# Patient Record
Sex: FEMALE | Race: WHITE | NOT HISPANIC OR LATINO | Employment: OTHER | URBAN - METROPOLITAN AREA
[De-identification: names, ages, dates, MRNs, and addresses within clinical notes are randomized per-mention and may not be internally consistent; named-entity substitution may affect disease eponyms.]

---

## 2021-05-24 ENCOUNTER — TRANSFERRED RECORDS (OUTPATIENT)
Dept: HEALTH INFORMATION MANAGEMENT | Facility: CLINIC | Age: 76
End: 2021-05-24

## 2021-05-24 ENCOUNTER — MEDICAL CORRESPONDENCE (OUTPATIENT)
Dept: HEALTH INFORMATION MANAGEMENT | Facility: CLINIC | Age: 76
End: 2021-05-24

## 2021-05-28 ENCOUNTER — TRANSCRIBE ORDERS (OUTPATIENT)
Dept: OTHER | Age: 76
End: 2021-05-28

## 2021-05-28 DIAGNOSIS — I71.40 ABDOMINAL ANEURYSM (H): Primary | ICD-10-CM

## 2021-06-02 NOTE — TELEPHONE ENCOUNTER
Action    Action Taken 6-2: Requested CT Chest 6-12-20 from [facility]  6-4: resolved CT chest in PACs

## 2021-07-08 ENCOUNTER — PRE VISIT (OUTPATIENT)
Dept: CARDIOLOGY | Facility: CLINIC | Age: 76
End: 2021-07-08

## 2021-07-08 ENCOUNTER — OFFICE VISIT (OUTPATIENT)
Dept: CARDIOLOGY | Facility: CLINIC | Age: 76
End: 2021-07-08
Attending: NURSE PRACTITIONER
Payer: COMMERCIAL

## 2021-07-08 VITALS
WEIGHT: 195 LBS | SYSTOLIC BLOOD PRESSURE: 124 MMHG | BODY MASS INDEX: 33.29 KG/M2 | OXYGEN SATURATION: 97 % | DIASTOLIC BLOOD PRESSURE: 76 MMHG | HEART RATE: 70 BPM | HEIGHT: 64 IN

## 2021-07-08 DIAGNOSIS — I71.21 THORACIC ASCENDING AORTIC ANEURYSM (H): Primary | ICD-10-CM

## 2021-07-08 DIAGNOSIS — I10 BENIGN ESSENTIAL HYPERTENSION: ICD-10-CM

## 2021-07-08 DIAGNOSIS — I71.40 ABDOMINAL ANEURYSM (H): ICD-10-CM

## 2021-07-08 PROCEDURE — 93005 ELECTROCARDIOGRAM TRACING: CPT

## 2021-07-08 PROCEDURE — 99204 OFFICE O/P NEW MOD 45 MIN: CPT | Mod: GC | Performed by: INTERNAL MEDICINE

## 2021-07-08 PROCEDURE — G0463 HOSPITAL OUTPT CLINIC VISIT: HCPCS | Mod: 25

## 2021-07-08 RX ORDER — LATANOPROST 50 UG/ML
1 SOLUTION/ DROPS OPHTHALMIC
COMMUNITY
Start: 2021-05-04 | End: 2024-08-15

## 2021-07-08 RX ORDER — SPIRONOLACTONE 25 MG/1
12.5 TABLET ORAL DAILY
Qty: 45 TABLET | Refills: 3 | Status: SHIPPED | OUTPATIENT
Start: 2021-07-08 | End: 2022-08-17

## 2021-07-08 RX ORDER — ALBUTEROL SULFATE 90 UG/1
1-2 AEROSOL, METERED RESPIRATORY (INHALATION)
COMMUNITY
Start: 2020-01-26

## 2021-07-08 RX ORDER — FLUTICASONE PROPIONATE 110 UG/1
1 AEROSOL, METERED RESPIRATORY (INHALATION)
COMMUNITY
Start: 2020-02-17

## 2021-07-08 RX ORDER — FOLIC ACID 1 MG/1
1 TABLET ORAL
COMMUNITY
Start: 2021-06-08

## 2021-07-08 RX ORDER — ALENDRONATE SODIUM 70 MG/1
70 TABLET ORAL
COMMUNITY
Start: 2021-06-08 | End: 2024-08-15

## 2021-07-08 RX ORDER — KETOCONAZOLE 20 MG/G
CREAM TOPICAL
COMMUNITY
Start: 2020-10-14

## 2021-07-08 RX ORDER — PREDNISONE 1 MG/1
3 TABLET ORAL
COMMUNITY
Start: 2021-01-22

## 2021-07-08 RX ORDER — OMEPRAZOLE 20 MG/1
40 TABLET, DELAYED RELEASE ORAL
COMMUNITY
Start: 2020-07-06 | End: 2024-08-15

## 2021-07-08 RX ORDER — AMLODIPINE BESYLATE 5 MG/1
5 TABLET ORAL
COMMUNITY
Start: 2020-07-16 | End: 2022-09-19

## 2021-07-08 RX ORDER — FLUTICASONE PROPIONATE 50 MCG
2 SPRAY, SUSPENSION (ML) NASAL
COMMUNITY
Start: 2020-01-23 | End: 2024-08-15

## 2021-07-08 RX ORDER — CYANOCOBALAMIN 1000 UG/ML
1000 INJECTION, SOLUTION INTRAMUSCULAR; SUBCUTANEOUS
COMMUNITY
Start: 2021-07-17 | End: 2022-07-11

## 2021-07-08 RX ORDER — FAMOTIDINE 40 MG/1
40 TABLET, FILM COATED ORAL
COMMUNITY
Start: 2020-08-20 | End: 2024-08-15

## 2021-07-08 RX ORDER — FUROSEMIDE 20 MG
TABLET ORAL
COMMUNITY
Start: 2021-01-11 | End: 2021-07-08 | Stop reason: ALTCHOICE

## 2021-07-08 RX ORDER — LEVOTHYROXINE SODIUM 175 UG/1
175 TABLET ORAL
COMMUNITY
Start: 2021-05-13 | End: 2023-10-26

## 2021-07-08 ASSESSMENT — MIFFLIN-ST. JEOR: SCORE: 1351.02

## 2021-07-08 ASSESSMENT — PAIN SCALES - GENERAL: PAINLEVEL: NO PAIN (0)

## 2021-07-08 NOTE — NURSING NOTE
Cardiac Testing: Patient given instructions regarding  echocardiogram . Discussed purpose, preparation, procedure and when to expect results reported back to the patient. Patient demonstrated understanding of this information and agreed to call with further questions or concerns.  Med Reconcile: Reviewed and verified all current medications with the patient. The updated medication list was printed and given to the patient.  New Medication: Patient was educated regarding newly prescribed medication, including discussion of  the indication, administration, side effects, and when to report to MD or RN. Patient demonstrated understanding of this information and agreed to call with further questions or concerns.  Return Appointment: Patient given instructions regarding scheduling next clinic visit. Patient demonstrated understanding of this information and agreed to call with further questions or concerns.  Medication Change: Patient was educated regarding prescribed medication change, including discussion of the indication, administration, side effects, and when to report to MD or RN. Patient demonstrated understanding of this information and agreed to call with further questions or concerns.  Patient stated she understood all health information given and agreed to call with further questions or concerns.    Ashley Farmer LPN

## 2021-07-08 NOTE — PATIENT INSTRUCTIONS
Patient Instructions:  It was a pleasure to see you in the cardiology clinic today.      If you have any questions, you can reach my nurse, Ashley KAUR LPN, at [phone].  Press Option #1 for the [facility], and then press Option #4 for nursing.    We are encouraging the use of [employer] to communicate with your HealthCare Provider    Medication Changes:   - Start Spironolactone 12.5 mg every day.  - Stop Furosamide (Lasix).     Recommendations: None.    Studies Ordered: Echocardiogram.  Please call [phone] to schedule.    The results from today include: None.    Please follow up: With Dr. Warren based on results of testing.    Sincerely,    Cy Warren MD     If you have an urgent need after hours (8:00 am to 4:30 pm) please call [phone] and ask for the cardiology fellow on call.      Patient Education     Spironolactone Oral Tablet 25 mg  Uses  This medicine is used for the following purposes:    heart failure    high blood pressure    hormonal disorder    swelling  Instructions  This medicine may be taken with or without food.  Do not take your last dose of the day within 4-6 hours of bedtime.  Keep the medicine at room temperature. Avoid heat and direct light.  This medicine will make you urinate more. If you have difficulty passing urine, please tell your doctor.  Talk to your doctor before eating foods with large amounts of potassium. Potassium is often found in salt substitutes. Your doctor may want you to reduce the amount of these foods.  It is important that you keep taking each dose of this medicine on time even if you are feeling well.  If you forget to take a dose on time, take it as soon as you remember. If it is almost time for the next dose, do not take the missed dose. Return to your normal dosing schedule. Do not take 2 doses of this medicine at one time.  Please tell your doctor and pharmacist about all the medicines you take. Include both prescription  and over-the-counter medicines. Also tell them about any vitamins, herbal medicines, or anything else you take for your health.  Do not suddenly stop taking this medicine. Check with your doctor before stopping.  It is very important that you follow your doctor's instructions for all blood tests.  Cautions  Tell your doctor and pharmacist if you ever had an allergic reaction to a medicine. Symptoms of an allergic reaction can include trouble breathing, skin rash, itching, swelling, or severe dizziness.  Do not use the medication any more than instructed.  Your ability to stay alert or to react quickly may be impaired by this medicine. Do not drive or operate machinery until you know how this medicine will affect you.  Tell the doctor or pharmacist if you are pregnant, planning to be pregnant, or breastfeeding.  Ask your pharmacist if this medicine can interact with any of your other medicines. Be sure to tell them about all the medicines you take.  Do not start or stop any other medicines without first speaking to your doctor or pharmacist.  Do not share this medicine with anyone who has not been prescribed this medicine.  Side Effects  The following is a list of some common side effects from this medicine. Please speak with your doctor about what you should do if you experience these or other side effects.    diarrhea    drowsiness or sedation    headaches    stomach upset or abdominal pain    increased urinary frequency    vomiting  Call your doctor or get medical help right away if you notice any of these more serious side effects:    breast pain or swelling    lack of energy and tiredness    low blood pressure    impotence    kidney problems  A few people may have an allergic reactions to this medicine. Symptoms can include difficulty breathing, skin rash, itching, swelling, or severe dizziness. If you notice any of these symptoms, seek medical help quickly.  Extra  Please speak with your doctor, nurse, or  pharmacist if you have any questions about this medicine.  [email or web address]  IMPORTANT NOTE: This document tells you briefly how to take your medicine, but it does not tell you all there is to know about it.Your doctor or pharmacist may give you other documents about your medicine. Please talk to them if you have any questions.Always follow their advice. There is a more complete description of this medicine available in English.Scan this code on your smartphone or tablet or use the web address below. You can also ask your pharmacist for a printout. If you have any questions, please ask your pharmacist.     2021 [employer].

## 2021-07-08 NOTE — NURSING NOTE
Chief Complaint   Patient presents with     New Patient     new pt visit megan     Vitals were taken, medications reconciled, and EKG performed.    Nick Rivera  5:14 PM

## 2021-07-08 NOTE — LETTER
7/8/2021      RE: Bernadette Coffey  [address]  [place] [address]       Dear Colleague,    Thank you for the opportunity to participate in the care of your patient, Bernaedtte Coffey, at the [facility]. Please see a copy of my visit note below.    HPI:     I had the privilege to evaluate and examine Ms Bernadette GARBER, who is a  76 year old woman with PMR (with possible giant cell arteritis) presenting to cardiology clinic for evaluation/second opinion regarding ascending aortic aneurysm.  Patient anxious that this may be a terminal or acute life threatening diagnosis. She also has a history of hypertension, takes norvasc 5 mg po daily which she has tolerated well. No exertional chest pain or dyspnea. She recently underwent a CTA chest to evaluate for large vessel inflammatory disease, found to have modest ascending aortic aneurysm. Recommended annual CTA for evaluation.     States she has no chest pressure, dyspnea, lightheadedness or dizziness at rest or with exertion. No orthopnea, PND, or LE edema.         PAST MEDICAL HISTORY:  Hypertension  Aortic aneurysm    CURRENT MEDICATIONS:  Current Outpatient Medications   Medication Sig Dispense Refill     albuterol (PROAIR HFA/PROVENTIL HFA/VENTOLIN HFA) 108 (90 Base) MCG/ACT inhaler Inhale 1-2 puffs into the lungs       alendronate (FOSAMAX) 70 MG tablet Take 70 mg by mouth       amLODIPine (NORVASC) 5 MG tablet Take 5 mg by mouth       cholecalciferol 25 MCG (1000 UT) TABS Take 1,000 Units by mouth       [START ON 7/17/2021] cyanocobalamin (CYANOCOBALAMIN) 1000 MCG/ML injection Inject 1,000 mcg into the muscle every 30 days       famotidine (PEPCID) 40 MG tablet Take 40 mg by mouth       fluticasone (FLONASE) 50 MCG/ACT nasal spray Spray 2 sprays in nostril       fluticasone (FLOVENT HFA) 110 MCG/ACT inhaler Inhale 1 puff into the lungs       folic acid (FOLVITE) 1 MG tablet  Take 1 mg by mouth       ketoconazole (NIZORAL) 2 % external cream        latanoprost (XALATAN) 0.005 % ophthalmic solution Apply 1 drop to eye       levothyroxine (SYNTHROID/LEVOTHROID) 175 MCG tablet Take 175 mcg by mouth       methotrexate 2.5 MG tablet Take 20 mg by mouth       omeprazole (PRILOSEC OTC) 20 MG EC tablet Take 40 mg by mouth       predniSONE (DELTASONE) 1 MG tablet Take 3 mg by mouth       spironolactone (ALDACTONE) 25 MG tablet Take 0.5 tablets (12.5 mg) by mouth daily 45 tablet 3       PAST SURGICAL HISTORY:  No past surgical history on file.    ALLERGIES    Sulfa drugs     Allergies   Allergen Reactions     Shellfish-Derived Products        FAMILY HISTORY:  No family history on file.    SOCIAL HISTORY:  Social History     Socioeconomic History     Marital status:      Spouse name: None     Number of children: None     Years of education: None     Highest education level: None   Occupational History     None   Social Needs     Financial resource strain: None     Food insecurity     Worry: None     Inability: None     Transportation needs     Medical: None     Non-medical: None   Tobacco Use     Smoking status: Never Smoker     Smokeless tobacco: Never Used   Substance and Sexual Activity     Alcohol use: None     Drug use: None     Sexual activity: None   Lifestyle     Physical activity     Days per week: None     Minutes per session: None     Stress: None   Relationships     Social connections     Talks on phone: None     Gets together: None     Attends [employer] service: None     Active member of club or organization: None     Attends meetings of clubs or organizations: None     Relationship status: None     Intimate partner violence     Fear of current or ex partner: None     Emotionally abused: None     Physically abused: None     Forced sexual activity: None   Other Topics Concern     None   Social History Narrative     None       ROS:   Constitutional: No fever, chills, or sweats. No  "weight gain/loss   ENT: No visual disturbance, ear ache, epistaxis, sore throat  Allergies/Immunologic: Negative.   Respiratory: No cough, hemoptysia  Cardiovascular: As per HPI  GI: No nausea, vomiting, hematemesis, melena, or hematochezia  : No urinary frequency, dysuria, or hematuria  Integument: Negative  Psychiatric: Negative  Neuro: Negative  Endocrinology: Negative   Musculoskeletal: Negative    EXAM:  /76 (BP Location: Left arm, Patient Position: Sitting, Cuff Size: Adult Large)   Pulse 70   Ht 1.62 m (5' 3.78\")   Wt 88.5 kg (195 lb)   SpO2 97%   BMI 33.70 kg/m       In general, the patient is a pleasant female in no apparent distress.    HEENT: NC/AT.  PERRLA.  EOMI.  Sclerae white, not injected.  Nares clear.  Pharynx without erythema or exudate.  Dentition intact.    Neck: No adenopathy.  No thyromegaly. Carotids +4/4 bilaterally without bruits.  No jugular venous distension.   Heart: RRR. Normal S1, S2 splits physiologically. No murmur, rub, click, or gallop. The PMI is in the 5th ICS in the midclavicular line. There is no heave.    Lungs: CTA.  No ronchi, wheezes, rales.  No dullness to percussion.   Abdomen: Soft, nontender, nondistended. No organomegaly.  No bruits.   Extremities: No clubbing, cyanosis, or edema.  The pulses are +4/4 at the radial, brachial, femoral, popliteal, DP, and PT sites bilaterally.  No bruits are noted.  Neurologic: Alert and oriented to person/place/time, normal speech, gait and affect  Skin: No petechiae, purpura or rash.        Procedures:    EKG (07-08-21): sin rhythm , left axis, slow R-progression in precordial leads    CTA 6/16/21:  CT ANGIO CHEST     CLINICAL INFORMATION: Aneurysm monitoring; ascending aortic aneurysm;     COMPARISON: CT chest 06/12/2020.     TECHNIQUE: CT angiogram of the chest was performed after the uneventful administration of intravenous contrast in the arterial phase. 3D reconstruction performed.     FINDINGS: There is no pleural " effusion or pneumothorax. There is no acute airspace disease. There is mild chronic interstitial lung disease, most evident in the peripheral right lower lobe which is unchanged. No acute airspace disease.     Mild biapical scarring.     Unchanged elevation of the right hemidiaphragm.     There are a few unchanged 3 mm and smaller solid noncalcified bilateral pulmonary nodules. The previously identified 4 mm solid noncalcified right lower lobe nodule is no longer present. The previously identified 5 mm ground-glass density right lower lobe nodule is no longer present.     There are no central pulmonary arterial emboli. No cardiomegaly or pericardial effusion.     There is minimal calcific atherosclerosis in the descending thoracic aorta. There is no evidence of acute aortic pathology such as dissection or penetrating aortic ulcer. Conventional great vessel origin pattern off the aortic arch. No acute abnormality in the limited visualized great vessels. The ascending thoracic aorta measures up to 4.1 cm in diameter on coronal image 37 compared to 4.4 cm when measured in a similar fashion previously. There is no aneurysm of the aortic arch or descending thoracic aorta.     There is no mediastinal, hilar, or axillary lymphadenopathy.     There is no acute abnormality in the limited visualized upper abdomen.     There are degenerative changes and ankylosis of the thoracic spine not significantly changed. No acute osseous abnormality.     IMPRESSION:   1. No evidence of acute thoracic aortic pathology. 4.1 cm ascending thoracic aortic aneurysm which can be followed with CT in 1 year.   2. No acute abnormality in the chest. 3 mm and smaller solid noncalcified bilateral pulmonary nodules of doubtful significance.   3. Ankylosis of the thoracic spine.      TTE 4/27/16:  Interpretation Summary   The left ventricle is normal in size.   Left ventricular systolic function is normal.   Ejection Fraction = >60%.   There is Grade  I diastolic dysfunction.   There is mild mitral regurgitation.   There is mild tricuspid regurgitation.   Right ventricular systolic pressure is 43 mmHg.   There is no pericardial effusion.     DSE 4/27/16:  Interpretation Summary   Normal Dobutamine stress echo with no evidence of ischemia by echocradiographic and EKG criteria. Normal resting wall motion and no stress-induced wall motion abnormality. Stress echo showed appropriate increase in myocardial contractility and increase in LVEF.   The left ventricle is normal in size.   Left ventricular systolic function is normal.   Ejection Fraction = >60%.         Assessment and Plan:     We discussed the results with patient.  We discussed the importance of a heart healthy diet and lifestyle.    76 year old woman with PMR, potential giant cell arteritis, presenting to clinic for incidental finding of ascending aortic aneurysm measuring 4.1 cm on recent CTA. She has been asymptomatic, has no audible murmurs concerning for AR on examination at this time. Last TTE showed normal biventricular function, Ao root of 2.8 cm without AR. Discussed with patient the meaning of aneurysm, and clinical implications, including risk of rupture and AR with higher diameters. Provided reassurance that this is not a life threatening diagnosis at this time, and can be serially monitored with 2D transthoracic echocardiogram alone if adequate measurements are obtained. Patient pleased with this information and willing to return to [facility] for TTE and cardiology follow up annually.     #Ascending Aortic Aneurysm  (4.1 cm on CTA):  - TTE to evaluate aneurysm and for presence of AR  - If good correlation between TTE and CTA aneurysm assessment, will get annual TTE rather than CTA   - Change furosemide to spironolactone 12.5 mg daily  - Pulse pressure and BP control adequate at this time    #HTN  - Change furosemide to spironolactone 12.5 mg daily (stop furosemide)  - Continue home BP  checks    Discussed with Dr Warren, who is in agreement with the plan.    Nicho Olmstead MD, MSc  Cardiovascular Disease Fellow  [facility]    Medication Changes:   - Start Spironolactone 12.5 mg every day.  - Stop Furosamide (Lasix).        Studies Ordered: Echocardiogram.  Please call [phone] to schedule.       I have interviewed patient with CV fellow. I have reviewed the laboratory tests  with patient and CV fellow. I have reviewed the management plan with the patient and the CV fellow. I discussed with the CV fellow and agree with the findings and plan in this CV fellow s note.  In addition to the assessment and plan changes  have been incorporated into the note by myself, as to make it a single cohesive document.  30 min were spent directly with patient during phone visit.     Cy Warren MD, PhD  Professor of Medicine  Division of Cardiology       CC  Patient Care Team:  Geetha Koch NP as PCP - Donna Burciaga MD as MD (Rheumatology)

## 2021-07-09 LAB — INTERPRETATION ECG - MUSE: NORMAL

## 2021-07-09 NOTE — PROGRESS NOTES
HPI:     I had the privilege to evaluate and examine Ms Bernadette GARBER, who is a  76 year old woman with PMR (with possible giant cell arteritis) presenting to cardiology clinic for evaluation/second opinion regarding ascending aortic aneurysm.  Patient anxious that this may be a terminal or acute life threatening diagnosis. She also has a history of hypertension, takes norvasc 5 mg po daily which she has tolerated well. No exertional chest pain or dyspnea. She recently underwent a CTA chest to evaluate for large vessel inflammatory disease, found to have modest ascending aortic aneurysm. Recommended annual CTA for evaluation.     States she has no chest pressure, dyspnea, lightheadedness or dizziness at rest or with exertion. No orthopnea, PND, or LE edema.         PAST MEDICAL HISTORY:  Hypertension  Aortic aneurysm    CURRENT MEDICATIONS:  Current Outpatient Medications   Medication Sig Dispense Refill     albuterol (PROAIR HFA/PROVENTIL HFA/VENTOLIN HFA) 108 (90 Base) MCG/ACT inhaler Inhale 1-2 puffs into the lungs       alendronate (FOSAMAX) 70 MG tablet Take 70 mg by mouth       amLODIPine (NORVASC) 5 MG tablet Take 5 mg by mouth       cholecalciferol 25 MCG (1000 UT) TABS Take 1,000 Units by mouth       [START ON 7/17/2021] cyanocobalamin (CYANOCOBALAMIN) 1000 MCG/ML injection Inject 1,000 mcg into the muscle every 30 days       famotidine (PEPCID) 40 MG tablet Take 40 mg by mouth       fluticasone (FLONASE) 50 MCG/ACT nasal spray Spray 2 sprays in nostril       fluticasone (FLOVENT HFA) 110 MCG/ACT inhaler Inhale 1 puff into the lungs       folic acid (FOLVITE) 1 MG tablet Take 1 mg by mouth       ketoconazole (NIZORAL) 2 % external cream        latanoprost (XALATAN) 0.005 % ophthalmic solution Apply 1 drop to eye       levothyroxine (SYNTHROID/LEVOTHROID) 175 MCG tablet Take 175 mcg by mouth       methotrexate 2.5 MG tablet Take 20 mg by mouth       omeprazole (PRILOSEC OTC) 20 MG EC tablet Take 40 mg by mouth        predniSONE (DELTASONE) 1 MG tablet Take 3 mg by mouth       spironolactone (ALDACTONE) 25 MG tablet Take 0.5 tablets (12.5 mg) by mouth daily 45 tablet 3       PAST SURGICAL HISTORY:  No past surgical history on file.    ALLERGIES    Sulfa drugs     Allergies   Allergen Reactions     Shellfish-Derived Products        FAMILY HISTORY:  No family history on file.    SOCIAL HISTORY:  Social History     Socioeconomic History     Marital status:      Spouse name: None     Number of children: None     Years of education: None     Highest education level: None   Occupational History     None   Social Needs     Financial resource strain: None     Food insecurity     Worry: None     Inability: None     Transportation needs     Medical: None     Non-medical: None   Tobacco Use     Smoking status: Never Smoker     Smokeless tobacco: Never Used   Substance and Sexual Activity     Alcohol use: None     Drug use: None     Sexual activity: None   Lifestyle     Physical activity     Days per week: None     Minutes per session: None     Stress: None   Relationships     Social connections     Talks on phone: None     Gets together: None     Attends [employer] service: None     Active member of club or organization: None     Attends meetings of clubs or organizations: None     Relationship status: None     Intimate partner violence     Fear of current or ex partner: None     Emotionally abused: None     Physically abused: None     Forced sexual activity: None   Other Topics Concern     None   Social History Narrative     None       ROS:   Constitutional: No fever, chills, or sweats. No weight gain/loss   ENT: No visual disturbance, ear ache, epistaxis, sore throat  Allergies/Immunologic: Negative.   Respiratory: No cough, hemoptysia  Cardiovascular: As per HPI  GI: No nausea, vomiting, hematemesis, melena, or hematochezia  : No urinary frequency, dysuria, or hematuria  Integument: Negative  Psychiatric: Negative  Neuro:  "Negative  Endocrinology: Negative   Musculoskeletal: Negative    EXAM:  /76 (BP Location: Left arm, Patient Position: Sitting, Cuff Size: Adult Large)   Pulse 70   Ht 1.62 m (5' 3.78\")   Wt 88.5 kg (195 lb)   SpO2 97%   BMI 33.70 kg/m       In general, the patient is a pleasant female in no apparent distress.    HEENT: NC/AT.  PERRLA.  EOMI.  Sclerae white, not injected.  Nares clear.  Pharynx without erythema or exudate.  Dentition intact.    Neck: No adenopathy.  No thyromegaly. Carotids +4/4 bilaterally without bruits.  No jugular venous distension.   Heart: RRR. Normal S1, S2 splits physiologically. No murmur, rub, click, or gallop. The PMI is in the 5th ICS in the midclavicular line. There is no heave.    Lungs: CTA.  No ronchi, wheezes, rales.  No dullness to percussion.   Abdomen: Soft, nontender, nondistended. No organomegaly.  No bruits.   Extremities: No clubbing, cyanosis, or edema.  The pulses are +4/4 at the radial, brachial, femoral, popliteal, DP, and PT sites bilaterally.  No bruits are noted.  Neurologic: Alert and oriented to person/place/time, normal speech, gait and affect  Skin: No petechiae, purpura or rash.        Procedures:    EKG (07-08-21): sin rhythm , left axis, slow R-progression in precordial leads    CTA 6/16/21:  CT ANGIO CHEST     CLINICAL INFORMATION: Aneurysm monitoring; ascending aortic aneurysm;     COMPARISON: CT chest 06/12/2020.     TECHNIQUE: CT angiogram of the chest was performed after the uneventful administration of intravenous contrast in the arterial phase. 3D reconstruction performed.     FINDINGS: There is no pleural effusion or pneumothorax. There is no acute airspace disease. There is mild chronic interstitial lung disease, most evident in the peripheral right lower lobe which is unchanged. No acute airspace disease.     Mild biapical scarring.     Unchanged elevation of the right hemidiaphragm.     There are a few unchanged 3 mm and smaller solid " noncalcified bilateral pulmonary nodules. The previously identified 4 mm solid noncalcified right lower lobe nodule is no longer present. The previously identified 5 mm ground-glass density right lower lobe nodule is no longer present.     There are no central pulmonary arterial emboli. No cardiomegaly or pericardial effusion.     There is minimal calcific atherosclerosis in the descending thoracic aorta. There is no evidence of acute aortic pathology such as dissection or penetrating aortic ulcer. Conventional great vessel origin pattern off the aortic arch. No acute abnormality in the limited visualized great vessels. The ascending thoracic aorta measures up to 4.1 cm in diameter on coronal image 37 compared to 4.4 cm when measured in a similar fashion previously. There is no aneurysm of the aortic arch or descending thoracic aorta.     There is no mediastinal, hilar, or axillary lymphadenopathy.     There is no acute abnormality in the limited visualized upper abdomen.     There are degenerative changes and ankylosis of the thoracic spine not significantly changed. No acute osseous abnormality.     IMPRESSION:   1. No evidence of acute thoracic aortic pathology. 4.1 cm ascending thoracic aortic aneurysm which can be followed with CT in 1 year.   2. No acute abnormality in the chest. 3 mm and smaller solid noncalcified bilateral pulmonary nodules of doubtful significance.   3. Ankylosis of the thoracic spine.      TTE 4/27/16:  Interpretation Summary   The left ventricle is normal in size.   Left ventricular systolic function is normal.   Ejection Fraction = >60%.   There is Grade I diastolic dysfunction.   There is mild mitral regurgitation.   There is mild tricuspid regurgitation.   Right ventricular systolic pressure is 43 mmHg.   There is no pericardial effusion.     DSE 4/27/16:  Interpretation Summary   Normal Dobutamine stress echo with no evidence of ischemia by echocradiographic and EKG criteria. Normal  resting wall motion and no stress-induced wall motion abnormality. Stress echo showed appropriate increase in myocardial contractility and increase in LVEF.   The left ventricle is normal in size.   Left ventricular systolic function is normal.   Ejection Fraction = >60%.         Assessment and Plan:     We discussed the results with patient.  We discussed the importance of a heart healthy diet and lifestyle.    76 year old woman with PMR, potential giant cell arteritis, presenting to clinic for incidental finding of ascending aortic aneurysm measuring 4.1 cm on recent CTA. She has been asymptomatic, has no audible murmurs concerning for AR on examination at this time. Last TTE showed normal biventricular function, Ao root of 2.8 cm without AR. Discussed with patient the meaning of aneurysm, and clinical implications, including risk of rupture and AR with higher diameters. Provided reassurance that this is not a life threatening diagnosis at this time, and can be serially monitored with 2D transthoracic echocardiogram alone if adequate measurements are obtained. Patient pleased with this information and willing to return to [facility] for TTE and cardiology follow up annually.     #Ascending Aortic Aneurysm  (4.1 cm on CTA):  - TTE to evaluate aneurysm and for presence of AR  - If good correlation between TTE and CTA aneurysm assessment, will get annual TTE rather than CTA   - Change furosemide to spironolactone 12.5 mg daily  - Pulse pressure and BP control adequate at this time    #HTN  - Change furosemide to spironolactone 12.5 mg daily (stop furosemide)  - Continue home BP checks    Discussed with Dr Warren, who is in agreement with the plan.    Nicho Olmstead MD, MSc  Cardiovascular Disease Fellow  [facility] [place]    Medication Changes:   - Start Spironolactone 12.5 mg every day.  - Stop Furosamide (Lasix).        Studies Ordered: Echocardiogram.  Please call [phone] to  schedule.       I have interviewed patient with CV fellow. I have reviewed the laboratory tests  with patient and CV fellow. I have reviewed the management plan with the patient and the CV fellow. I discussed with the CV fellow and agree with the findings and plan in this CV fellow s note.  In addition to the assessment and plan changes  have been incorporated into the note by myself, as to make it a single cohesive document.  30 min were spent directly with patient during phone visit.     Cy Warren MD, PhD  Professor of Medicine  Division of Cardiology         CC  Patient Care Team:  Alex Casper NP as PCP - General  Alex Casper NP as Referring Physician  Cy Warren MD as MD (Cardiovascular Disease)  Donna Barrett MD as MD (Rheumatology)  ALEX CASPER

## 2021-07-20 ENCOUNTER — TELEPHONE (OUTPATIENT)
Dept: CARDIOLOGY | Facility: CLINIC | Age: 76
End: 2021-07-20

## 2021-07-20 NOTE — TELEPHONE ENCOUNTER
[facility] Call Center    Phone Message    May a detailed message be left on voicemail: yes     Reason for Call: Other: Pt called in stating that Dr. Warren recommended her to get an echo at a [facility] location that was near [facility], however she forgot the name of the clinic and doesn't have their contact info. Please review and call her back.      Action Taken: Message routed to:  [facility] Cardio    Travel Screening: Not Applicable

## 2021-07-21 NOTE — TELEPHONE ENCOUNTER
Called and spoke with Pt.  Provided information for [facility] as well as scheduling telephone to schedule echocardiogram.    Ashley Farmer LPN

## 2021-08-09 ENCOUNTER — TELEPHONE (OUTPATIENT)
Dept: CARDIOLOGY | Facility: CLINIC | Age: 76
End: 2021-08-09

## 2021-08-09 DIAGNOSIS — I71.21 THORACIC ASCENDING AORTIC ANEURYSM (H): Primary | ICD-10-CM

## 2021-08-09 NOTE — TELEPHONE ENCOUNTER
[facility] Call Center    Phone Message    May a detailed message be left on voicemail: yes     Reason for Call: Other: stan calling to report that [facility] does not have the echocardiogram orders. please have dr. guzman write these orders and fax them over to [facility] and [facility]". please reach out to stan once they have been writter and faxed. thank you!     Action Taken: Message routed to:  [facility] cardio    Travel Screening: Not Applicable

## 2021-08-09 NOTE — TELEPHONE ENCOUNTER
Date: 8/9/2021    Time of Call: 4:47 PM     Diagnosis: ascending aortic aneurysm     [ TORB ] Ordering provider: Dr Cy Warren  Order: Echo Complete     Order received by: Ashley Farmer LPN     Follow-up/additional notes: Requested in clinic, but order inadvertently not placed.  Called and spoke with Pt.  Discussed order was now placed and Pt could contact site for scheduling.  Pt verbalized understanding, agreed to current plan and denied any further questions.

## 2021-09-18 ENCOUNTER — HOSPITAL ENCOUNTER (OUTPATIENT)
Dept: CARDIOLOGY | Facility: OTHER | Age: 76
Discharge: HOME | End: 2021-09-18
Attending: INTERNAL MEDICINE | Admitting: INTERNAL MEDICINE
Payer: COMMERCIAL

## 2021-09-18 DIAGNOSIS — I71.21 THORACIC ASCENDING AORTIC ANEURYSM (H): ICD-10-CM

## 2021-09-18 LAB — LVEF ECHO: NORMAL

## 2021-09-18 PROCEDURE — 93306 TTE W/DOPPLER COMPLETE: CPT | Mod: 26 | Performed by: INTERNAL MEDICINE

## 2021-09-18 PROCEDURE — 93306 TTE W/DOPPLER COMPLETE: CPT

## 2022-04-14 ENCOUNTER — TELEPHONE (OUTPATIENT)
Dept: CARDIOLOGY | Facility: CLINIC | Age: 77
End: 2022-04-14
Payer: COMMERCIAL

## 2022-04-14 DIAGNOSIS — I71.21 THORACIC ASCENDING AORTIC ANEURYSM (H): Primary | ICD-10-CM

## 2022-04-14 NOTE — TELEPHONE ENCOUNTER
[facility] Call Center    Phone Message    May a detailed message be left on voicemail: yes     Reason for Call: Order(s): Other:   Reason for requested: Echo Complete w Doppler  Date needed: around 6/9/22   Provider name: Dr. Warren      Action Taken: Message routed to:  [facility] Cardio    Travel Screening: Not Applicable

## 2022-08-13 DIAGNOSIS — I71.21 THORACIC ASCENDING AORTIC ANEURYSM (H): Primary | ICD-10-CM

## 2022-08-13 DIAGNOSIS — I10 BENIGN ESSENTIAL HYPERTENSION: ICD-10-CM

## 2022-08-17 RX ORDER — SPIRONOLACTONE 25 MG/1
12.5 TABLET ORAL DAILY
Qty: 45 TABLET | Refills: 0 | Status: SHIPPED | OUTPATIENT
Start: 2022-08-17 | End: 2022-09-19

## 2022-09-07 ENCOUNTER — TELEPHONE (OUTPATIENT)
Dept: CARDIOLOGY | Facility: CLINIC | Age: 77
End: 2022-09-07

## 2022-09-07 DIAGNOSIS — Z00.00 PREVENTATIVE HEALTH CARE: Primary | ICD-10-CM

## 2022-09-19 ENCOUNTER — OFFICE VISIT (OUTPATIENT)
Dept: CARDIOLOGY | Facility: CLINIC | Age: 77
End: 2022-09-19
Attending: INTERNAL MEDICINE
Payer: COMMERCIAL

## 2022-09-19 ENCOUNTER — LAB (OUTPATIENT)
Dept: LAB | Facility: CLINIC | Age: 77
End: 2022-09-19
Payer: COMMERCIAL

## 2022-09-19 VITALS
BODY MASS INDEX: 30.96 KG/M2 | WEIGHT: 185.8 LBS | HEIGHT: 65 IN | SYSTOLIC BLOOD PRESSURE: 103 MMHG | HEART RATE: 81 BPM | DIASTOLIC BLOOD PRESSURE: 61 MMHG | OXYGEN SATURATION: 96 %

## 2022-09-19 DIAGNOSIS — I71.21 THORACIC ASCENDING AORTIC ANEURYSM (H): ICD-10-CM

## 2022-09-19 DIAGNOSIS — Z00.00 PREVENTATIVE HEALTH CARE: ICD-10-CM

## 2022-09-19 DIAGNOSIS — I71.21 THORACIC ASCENDING AORTIC ANEURYSM (H): Primary | ICD-10-CM

## 2022-09-19 DIAGNOSIS — I10 BENIGN ESSENTIAL HYPERTENSION: ICD-10-CM

## 2022-09-19 LAB
ALBUMIN SERPL BCG-MCNC: 4.2 G/DL (ref 3.5–5.2)
ALP SERPL-CCNC: 104 U/L (ref 35–104)
ALT SERPL W P-5'-P-CCNC: 9 U/L (ref 10–35)
ANION GAP SERPL CALCULATED.3IONS-SCNC: 9 MMOL/L (ref 7–15)
AST SERPL W P-5'-P-CCNC: 21 U/L (ref 10–35)
BILIRUB SERPL-MCNC: 0.4 MG/DL
BUN SERPL-MCNC: 10.7 MG/DL (ref 8–23)
CALCIUM SERPL-MCNC: 9.7 MG/DL (ref 8.8–10.2)
CHLORIDE SERPL-SCNC: 96 MMOL/L (ref 98–107)
CHOLEST SERPL-MCNC: 176 MG/DL
CREAT SERPL-MCNC: 0.67 MG/DL (ref 0.51–0.95)
DEPRECATED HCO3 PLAS-SCNC: 25 MMOL/L (ref 22–29)
GFR SERPL CREATININE-BSD FRML MDRD: 89 ML/MIN/1.73M2
GLUCOSE SERPL-MCNC: 96 MG/DL (ref 70–99)
HDLC SERPL-MCNC: 42 MG/DL
LDLC SERPL CALC-MCNC: 113 MG/DL
LDLC SERPL DIRECT ASSAY-MCNC: 109 MG/DL
LVEF ECHO: NORMAL
NONHDLC SERPL-MCNC: 134 MG/DL
POTASSIUM SERPL-SCNC: 4.8 MMOL/L (ref 3.4–5.3)
PROT SERPL-MCNC: 7.2 G/DL (ref 6.4–8.3)
SODIUM SERPL-SCNC: 130 MMOL/L (ref 136–145)
TRIGL SERPL-MCNC: 106 MG/DL

## 2022-09-19 PROCEDURE — 99000 SPECIMEN HANDLING OFFICE-LAB: CPT | Performed by: PATHOLOGY

## 2022-09-19 PROCEDURE — 99214 OFFICE O/P EST MOD 30 MIN: CPT | Mod: 25 | Performed by: INTERNAL MEDICINE

## 2022-09-19 PROCEDURE — G0463 HOSPITAL OUTPT CLINIC VISIT: HCPCS

## 2022-09-19 PROCEDURE — 93306 TTE W/DOPPLER COMPLETE: CPT | Performed by: INTERNAL MEDICINE

## 2022-09-19 PROCEDURE — 36415 COLL VENOUS BLD VENIPUNCTURE: CPT | Performed by: PATHOLOGY

## 2022-09-19 PROCEDURE — 80061 LIPID PANEL: CPT | Performed by: PATHOLOGY

## 2022-09-19 PROCEDURE — 80053 COMPREHEN METABOLIC PANEL: CPT | Performed by: PATHOLOGY

## 2022-09-19 RX ORDER — AMLODIPINE BESYLATE 5 MG/1
5 TABLET ORAL DAILY
Qty: 90 TABLET | Refills: 3 | Status: SHIPPED | OUTPATIENT
Start: 2022-09-19 | End: 2023-09-18

## 2022-09-19 RX ORDER — SPIRONOLACTONE 25 MG/1
12.5 TABLET ORAL DAILY
Qty: 45 TABLET | Refills: 3 | Status: SHIPPED | OUTPATIENT
Start: 2022-09-19 | End: 2023-09-18

## 2022-09-19 ASSESSMENT — PAIN SCALES - GENERAL: PAINLEVEL: NO PAIN (0)

## 2022-09-19 NOTE — NURSING NOTE
Chief Complaint   Patient presents with     Follow Up     : Ascending aortic aneurysm, HTN       Vitals were taken and medications were reconciled.   Didier Sinclair, EMT  3:04 PM

## 2022-09-19 NOTE — LETTER
9/19/2022      RE: Bernadette Coffey  [address]  [place] [address]       Dear Colleague,    Thank you for the opportunity to participate in the care of your patient, Bernadette Coffey, at the [facility] [place] [facility]. Please see a copy of my visit note below.    HPI:     I had the privilege to evaluate and examine Ms Bernadette GARBER, who is a  76 year old woman with PMR (with possible giant cell arteritis) presenting to cardiology clinic for evaluation/second opinion regarding ascending aortic aneurysm.  Patient is anxious that this may be a terminal or acute life threatening diagnosis. She also has a history of hypertension, takes norvasc 5 mg po daily which she has tolerated well. No exertional chest pain or dyspnea. She recently underwent a CTA chest to evaluate for large vessel inflammatory disease, found to have modest ascending aortic aneurysm.  Last year CT chest:  4.1 cm ascending thoracic aortic aneurysm.     Recommended annual CTA for evaluation.      States she has no chest pressure, dyspnea, lightheadedness or dizziness at rest or with exertion. No orthopnea, PND, or LE edema.        PAST MEDICAL HISTORY:    Polymyalgia rheumatica  Giant cell arteries   Open angle glaucoma  Right breast CA  Hypothyroidism  GERD        CURRENT MEDICATIONS:  Current Outpatient Medications   Medication Sig Dispense Refill     albuterol (PROAIR HFA/PROVENTIL HFA/VENTOLIN HFA) 108 (90 Base) MCG/ACT inhaler Inhale 1-2 puffs into the lungs       alendronate (FOSAMAX) 70 MG tablet Take 70 mg by mouth       amLODIPine (NORVASC) 5 MG tablet Take 5 mg by mouth       famotidine (PEPCID) 40 MG tablet Take 40 mg by mouth       fluticasone (FLONASE) 50 MCG/ACT nasal spray Spray 2 sprays in nostril       fluticasone (FLOVENT HFA) 110 MCG/ACT inhaler Inhale 1 puff into the lungs       folic acid (FOLVITE) 1 MG tablet Take 1 mg by mouth       ketoconazole  "(NIZORAL) 2 % external cream        latanoprost (XALATAN) 0.005 % ophthalmic solution Apply 1 drop to eye       levothyroxine (SYNTHROID/LEVOTHROID) 175 MCG tablet Take 175 mcg by mouth       methotrexate 2.5 MG tablet Take 20 mg by mouth       omeprazole (PRILOSEC OTC) 20 MG EC tablet Take 40 mg by mouth       predniSONE (DELTASONE) 1 MG tablet Take 3 mg by mouth       spironolactone (ALDACTONE) 25 MG tablet Take 0.5 tablets (12.5 mg) by mouth daily 45 tablet 0     cholecalciferol 25 MCG (1000 UT) TABS Take 1,000 Units by mouth (Patient not taking: Reported on 9/19/2022)         PAST SURGICAL HISTORY:  Breast Cancer right     ALLERGIES     Allergies   Allergen Reactions     Shellfish-Derived Products        FAMILY HISTORY:  No family history on file.    SOCIAL HISTORY:  Social History     Socioeconomic History     Marital status:      Spouse name: None     Number of children: None     Years of education: None     Highest education level: None   Tobacco Use     Smoking status: Never Smoker     Smokeless tobacco: Never Used       ROS:   Constitutional: No fever, chills, or sweats. No weight gain/loss   ENT: No visual disturbance, ear ache, epistaxis, sore throat  Allergies/Immunologic: Negative.   Respiratory: No cough, hemoptysia  Cardiovascular: As per HPI  GI: No nausea, vomiting, hematemesis, melena, or hematochezia  : No urinary frequency, dysuria, or hematuria  Integument: Negative  Psychiatric: Negative  Neuro: Negative  Endocrinology: Negative   Musculoskeletal: Negative    EXAM:  /61 (BP Location: Left arm, Patient Position: Sitting, Cuff Size: Adult Large)   Pulse 81   Ht 1.651 m (5' 5\")   Wt 84.3 kg (185 lb 12.8 oz)   SpO2 96%   BMI 30.92 kg/m    In general, the patient is a pleasant female in no apparent distress.    HEENT: NC/AT.  PERRLA.  EOMI.  Sclerae white, not injected.  Nares clear.  Pharynx without erythema or exudate.  Dentition intact.    Neck: No adenopathy.  No thyromegaly. " Carotids +4/4 bilaterally without bruits.  No jugular venous distension.   Heart: RRR. Normal S1, S2 splits physiologically. No murmur, rub, click, or gallop. The PMI is in the 5th ICS in the midclavicular line. There is no heave.    Lungs: CTA.  No ronchi, wheezes, rales.  No dullness to percussion.   Abdomen: Soft, nontender, nondistended. No organomegaly.  No bruits.   Extremities: No clubbing, cyanosis, or edema.  The pulses are +4/4 at the radial, brachial, femoral, popliteal, DP, and PT sites bilaterally.  No bruits are noted.  Neurologic: Alert and oriented to person/place/time, normal speech, gait and affect  Skin: No petechiae, purpura or rash.    Labs:  LIPID RESULTS:  Lab Results   Component Value Date    CHOL 176 09/19/2022    HDL 42 (L) 09/19/2022     (H) 09/19/2022     (H) 09/19/2022    TRIG 106 09/19/2022    NHDL 134 (H) 09/19/2022       LIVER ENZYME RESULTS:  Lab Results   Component Value Date    AST 21 09/19/2022    ALT 9 (L) 09/19/2022       BMP RESULTS:  Lab Results   Component Value Date     (L) 09/19/2022    POTASSIUM 4.8 09/19/2022    CHLORIDE 96 (L) 09/19/2022    CO2 25 09/19/2022    ANIONGAP 9 09/19/2022    GLC 96 09/19/2022    BUN 10.7 09/19/2022    CR 0.67 09/19/2022    GFRESTIMATED 89 09/19/2022    JAZMÍN 9.7 09/19/2022          Procedures:    Echocardiogram    Global and regional left ventricular function is normal with an EF of 60-65%.  Right ventricular function, chamber size, wall motion, and thickness are  normal.  Ascending aorta 4.3 cm.  Mild dilatation of the aorta is present.  No significant changes noted.  ______________________________________________________________________________  Left Ventricle  Global and regional left ventricular function is normal with an EF of 60-65%.  Left ventricular size is normal. Relative wall thickness is increased  consistent with concentric remodeling. Grade I or early diastolic dysfunction.  No regional wall motion abnormalities  are seen.     Right Ventricle  Right ventricular function, chamber size, wall motion, and thickness are  normal.     Atria  Both atria appear normal. The atrial septum is intact as assessed by color  Doppler .     Mitral Valve  The mitral valve is normal. Trace mitral insufficiency is present.     Aortic Valve  The aortic valve is tricuspid. Trace aortic insufficiency is present.     Tricuspid Valve  The tricuspid valve is normal. Trace tricuspid insufficiency is present. The  right ventricular systolic pressure is approximated at 18.6 mmHg plus the  right atrial pressure. Pulmonary artery systolic pressure is normal.     Pulmonic Valve  The pulmonic valve is normal. Trace pulmonic insufficiency is present.     Vessels  The pulmonary artery and bifurcation cannot be assessed. The inferior vena  cava is normal. Ascending aorta 4.3 cm. Mild dilatation of the aorta is  present.     Pericardium  No pericardial effusion is present.     Compared to Previous Study  No significant changes noted.  ______________________________________________________________________________  MMode/2D Measurements & Calculations     IVSd: 1.1 cm  LVIDd: 4.1 cm  LVIDs: 2.5 cm  LVPWd: 0.86 cm  FS: 40.3 %  LV mass(C)d: 130.4 grams  LV mass(C)dI: 67.4 grams/m2  Ao root diam: 3.0 cm  asc Aorta Diam: 4.3 cm  LVOT diam: 2.0 cm  LVOT area: 3.3 cm2  LA Volume (BP): 33.4 ml  LA Volume Index (BP): 17.2 ml/m2  RWT: 0.42     Doppler Measurements & Calculations  MV E max magdalene: 82.9 cm/sec  MV A max magdalene: 80.5 cm/sec  MV E/A: 1.0  MV dec slope: 285.5 cm/sec2  MV dec time: 0.29 sec  PA V2 max: 83.4 cm/sec  PA max P.8 mmHg  PA acc time: 0.09 sec  TR max magdalene: 215.8 cm/sec  TR max P.6 mmHg  E/E' av.6  Lateral E/e': 10.7  Medial E/e': 16.6         Assessment and Plan:     We discussed the results with patient.  We discussed the importance of a heart healthy diet and lifestyle.    Medication Changes: None        Follow Up: In 1 year with fasting labs  and echocardiogram prior to visit.    Cy Warren MD, PhD  Professor of Medicine  Division of Cardiology      CC  Patient Care Team:  Geetha Koch NP as PCP - General  [facility], Donna Lopez MD as MD (Rheumatology)  Erika Moser PA-C as Hospitalist (Cardiovascular Disease)  Lenore Villa, RN as Specialty Care Coordinator (Cardiology)

## 2022-09-19 NOTE — NURSING NOTE
September 19, 2022    Medication Changes: None      Follow Up: In 1 year with fasting labs and echocardiogram prior to visit      Patient verbalized understanding of all health information given and agreed to call with further questions or concerns.      Lenore Villa RN

## 2022-09-19 NOTE — PROGRESS NOTES
HPI:     I had the privilege to evaluate and examine Ms Bernadette GARBER, who is a  76 year old woman with PMR (with possible giant cell arteritis) presenting to cardiology clinic for evaluation/second opinion regarding ascending aortic aneurysm.  Patient is anxious that this may be a terminal or acute life threatening diagnosis. She also has a history of hypertension, takes norvasc 5 mg po daily which she has tolerated well. No exertional chest pain or dyspnea. She recently underwent a CTA chest to evaluate for large vessel inflammatory disease, found to have modest ascending aortic aneurysm.  Last year CT chest:  4.1 cm ascending thoracic aortic aneurysm.     Recommended annual CTA for evaluation.      States she has no chest pressure, dyspnea, lightheadedness or dizziness at rest or with exertion. No orthopnea, PND, or LE edema.        PAST MEDICAL HISTORY:    Polymyalgia rheumatica  Giant cell arteries   Open angle glaucoma  Right breast CA  Hypothyroidism  GERD        CURRENT MEDICATIONS:  Current Outpatient Medications   Medication Sig Dispense Refill     albuterol (PROAIR HFA/PROVENTIL HFA/VENTOLIN HFA) 108 (90 Base) MCG/ACT inhaler Inhale 1-2 puffs into the lungs       alendronate (FOSAMAX) 70 MG tablet Take 70 mg by mouth       amLODIPine (NORVASC) 5 MG tablet Take 5 mg by mouth       famotidine (PEPCID) 40 MG tablet Take 40 mg by mouth       fluticasone (FLONASE) 50 MCG/ACT nasal spray Spray 2 sprays in nostril       fluticasone (FLOVENT HFA) 110 MCG/ACT inhaler Inhale 1 puff into the lungs       folic acid (FOLVITE) 1 MG tablet Take 1 mg by mouth       ketoconazole (NIZORAL) 2 % external cream        latanoprost (XALATAN) 0.005 % ophthalmic solution Apply 1 drop to eye       levothyroxine (SYNTHROID/LEVOTHROID) 175 MCG tablet Take 175 mcg by mouth       methotrexate 2.5 MG tablet Take 20 mg by mouth       omeprazole (PRILOSEC OTC) 20 MG EC tablet Take 40 mg by mouth       predniSONE (DELTASONE) 1 MG tablet Take  "3 mg by mouth       spironolactone (ALDACTONE) 25 MG tablet Take 0.5 tablets (12.5 mg) by mouth daily 45 tablet 0     cholecalciferol 25 MCG (1000 UT) TABS Take 1,000 Units by mouth (Patient not taking: Reported on 9/19/2022)         PAST SURGICAL HISTORY:  Breast Cancer right     ALLERGIES     Allergies   Allergen Reactions     Shellfish-Derived Products        FAMILY HISTORY:  No family history on file.    SOCIAL HISTORY:  Social History     Socioeconomic History     Marital status:      Spouse name: None     Number of children: None     Years of education: None     Highest education level: None   Tobacco Use     Smoking status: Never Smoker     Smokeless tobacco: Never Used       ROS:   Constitutional: No fever, chills, or sweats. No weight gain/loss   ENT: No visual disturbance, ear ache, epistaxis, sore throat  Allergies/Immunologic: Negative.   Respiratory: No cough, hemoptysia  Cardiovascular: As per HPI  GI: No nausea, vomiting, hematemesis, melena, or hematochezia  : No urinary frequency, dysuria, or hematuria  Integument: Negative  Psychiatric: Negative  Neuro: Negative  Endocrinology: Negative   Musculoskeletal: Negative    EXAM:  /61 (BP Location: Left arm, Patient Position: Sitting, Cuff Size: Adult Large)   Pulse 81   Ht 1.651 m (5' 5\")   Wt 84.3 kg (185 lb 12.8 oz)   SpO2 96%   BMI 30.92 kg/m    In general, the patient is a pleasant female in no apparent distress.    HEENT: NC/AT.  PERRLA.  EOMI.  Sclerae white, not injected.  Nares clear.  Pharynx without erythema or exudate.  Dentition intact.    Neck: No adenopathy.  No thyromegaly. Carotids +4/4 bilaterally without bruits.  No jugular venous distension.   Heart: RRR. Normal S1, S2 splits physiologically. No murmur, rub, click, or gallop. The PMI is in the 5th ICS in the midclavicular line. There is no heave.    Lungs: CTA.  No ronchi, wheezes, rales.  No dullness to percussion.   Abdomen: Soft, nontender, nondistended. No " organomegaly.  No bruits.   Extremities: No clubbing, cyanosis, or edema.  The pulses are +4/4 at the radial, brachial, femoral, popliteal, DP, and PT sites bilaterally.  No bruits are noted.  Neurologic: Alert and oriented to person/place/time, normal speech, gait and affect  Skin: No petechiae, purpura or rash.    Labs:  LIPID RESULTS:  Lab Results   Component Value Date    CHOL 176 09/19/2022    HDL 42 (L) 09/19/2022     (H) 09/19/2022     (H) 09/19/2022    TRIG 106 09/19/2022    NHDL 134 (H) 09/19/2022       LIVER ENZYME RESULTS:  Lab Results   Component Value Date    AST 21 09/19/2022    ALT 9 (L) 09/19/2022       BMP RESULTS:  Lab Results   Component Value Date     (L) 09/19/2022    POTASSIUM 4.8 09/19/2022    CHLORIDE 96 (L) 09/19/2022    CO2 25 09/19/2022    ANIONGAP 9 09/19/2022    GLC 96 09/19/2022    BUN 10.7 09/19/2022    CR 0.67 09/19/2022    GFRESTIMATED 89 09/19/2022    JAZMÍN 9.7 09/19/2022          Procedures:    Echocardiogram    Global and regional left ventricular function is normal with an EF of 60-65%.  Right ventricular function, chamber size, wall motion, and thickness are  normal.  Ascending aorta 4.3 cm.  Mild dilatation of the aorta is present.  No significant changes noted.  ______________________________________________________________________________  Left Ventricle  Global and regional left ventricular function is normal with an EF of 60-65%.  Left ventricular size is normal. Relative wall thickness is increased  consistent with concentric remodeling. Grade I or early diastolic dysfunction.  No regional wall motion abnormalities are seen.     Right Ventricle  Right ventricular function, chamber size, wall motion, and thickness are  normal.     Atria  Both atria appear normal. The atrial septum is intact as assessed by color  Doppler .     Mitral Valve  The mitral valve is normal. Trace mitral insufficiency is present.     Aortic Valve  The aortic valve is tricuspid.  Trace aortic insufficiency is present.     Tricuspid Valve  The tricuspid valve is normal. Trace tricuspid insufficiency is present. The  right ventricular systolic pressure is approximated at 18.6 mmHg plus the  right atrial pressure. Pulmonary artery systolic pressure is normal.     Pulmonic Valve  The pulmonic valve is normal. Trace pulmonic insufficiency is present.     Vessels  The pulmonary artery and bifurcation cannot be assessed. The inferior vena  cava is normal. Ascending aorta 4.3 cm. Mild dilatation of the aorta is  present.     Pericardium  No pericardial effusion is present.     Compared to Previous Study  No significant changes noted.  ______________________________________________________________________________  MMode/2D Measurements & Calculations     IVSd: 1.1 cm  LVIDd: 4.1 cm  LVIDs: 2.5 cm  LVPWd: 0.86 cm  FS: 40.3 %  LV mass(C)d: 130.4 grams  LV mass(C)dI: 67.4 grams/m2  Ao root diam: 3.0 cm  asc Aorta Diam: 4.3 cm  LVOT diam: 2.0 cm  LVOT area: 3.3 cm2  LA Volume (BP): 33.4 ml  LA Volume Index (BP): 17.2 ml/m2  RWT: 0.42     Doppler Measurements & Calculations  MV E max magdalene: 82.9 cm/sec  MV A max magdalene: 80.5 cm/sec  MV E/A: 1.0  MV dec slope: 285.5 cm/sec2  MV dec time: 0.29 sec  PA V2 max: 83.4 cm/sec  PA max P.8 mmHg  PA acc time: 0.09 sec  TR max magdalene: 215.8 cm/sec  TR max P.6 mmHg  E/E' av.6  Lateral E/e': 10.7  Medial E/e': 16.6         Assessment and Plan:     We discussed the results with patient.  We discussed the importance of a heart healthy diet and lifestyle.    Medication Changes: None        Follow Up: In 1 year with fasting labs and echocardiogram prior to visit.    Cy Warren MD, PhD  Professor of Medicine  Division of Cardiology      CC  Patient Care Team:  Geetha Koch NP as PCP - General  eGetha Koch NP as Referring Physician  Cy Warren MD as MD (Cardiovascular Disease)  Donna Barrett MD as MD (Rheumatology)  Cy Warren MD  as Assigned Heart and Vascular Provider  Erika Moser PA-C as Hospitalist (Cardiovascular Disease)  Erika Moser PA-C as Hospitalist (Cardiovascular Disease)  Lenore Villa RN as Specialty Care Coordinator (Cardiology)  GEM LAMAR

## 2022-09-19 NOTE — PATIENT INSTRUCTIONS
September 19, 2022    Cardiology Provider You Saw During Your Visit: Dr. Warren      Medication Changes: None      Follow Up: In 1 year with fasting labs and echocardiogram prior to visit      Labs:    Latest Reference Range & Units 09/19/22 10:56   Sodium 136 - 145 mmol/L 130 (L)   Potassium 3.4 - 5.3 mmol/L 4.8   Chloride 98 - 107 mmol/L 96 (L)   Carbon Dioxide (CO2) 22 - 29 mmol/L 25   Urea Nitrogen 8.0 - 23.0 mg/dL 10.7   Creatinine 0.51 - 0.95 mg/dL 0.67   GFR Estimate >60 mL/min/1.73m2 89   Calcium 8.8 - 10.2 mg/dL 9.7   Anion Gap 7 - 15 mmol/L 9   Albumin 3.5 - 5.2 g/dL 4.2   Protein Total 6.4 - 8.3 g/dL 7.2   Alkaline Phosphatase 35 - 104 U/L 104   ALT 10 - 35 U/L 9 (L)   AST 10 - 35 U/L 21   Bilirubin Total <=1.2 mg/dL 0.4   Cholesterol <200 mg/dL 176   Glucose 70 - 99 mg/dL 96   HDL Cholesterol >=50 mg/dL 42 (L)   LDL Cholesterol Calculated <=100 mg/dL 113 (H)   LDL Cholesterol Direct <100 mg/dL 109 (H)   Non HDL Cholesterol <130 mg/dL 134 (H)   Triglycerides <150 mg/dL 106   (L): Data is abnormally low  (H): Data is abnormally high        Follow the American Heart Association Diet and Lifestyle Recommendations:  -Limit saturated fat, trans fat, sodium, red meat, sweets and sugar-sweetened beverages. If you choose to eat red meat, compare labels and select the leanest cuts available.  -Aim for at least 150 minutes of moderate physical activity or 75 minutes of vigorous physical activity - or an equal combination of both - each week.      To Reach Us:  -During business hours: [phone], press option # 1 to schedule an appointment or to leave a message for your care team.     -After hours, weekends or holidays: [phone], press option #4 and ask to speak to the on-call cardiologist. Inform them you are a patient at the [facility].      Lneore Villa RN  Cardiology Care Coordinator - General Cardiology  [facility]

## 2023-07-14 ENCOUNTER — TELEPHONE (OUTPATIENT)
Dept: CARDIOLOGY | Facility: CLINIC | Age: 78
End: 2023-07-14
Payer: COMMERCIAL

## 2023-07-14 NOTE — TELEPHONE ENCOUNTER
[facility] Call Center    Phone Message    May a detailed message be left on voicemail: yes     Reason for Call: Other: patient wants to reschedule her echo for sept 18th please extend order, thank you.     Action Taken: Other: cardiology    Travel Screening: Not Applicable   Thank you!  [facility]

## 2023-09-14 DIAGNOSIS — I10 BENIGN ESSENTIAL HYPERTENSION: Primary | ICD-10-CM

## 2023-09-18 ENCOUNTER — OFFICE VISIT (OUTPATIENT)
Dept: CARDIOLOGY | Facility: CLINIC | Age: 78
End: 2023-09-18
Attending: INTERNAL MEDICINE
Payer: COMMERCIAL

## 2023-09-18 ENCOUNTER — LAB (OUTPATIENT)
Dept: LAB | Facility: CLINIC | Age: 78
End: 2023-09-18
Payer: COMMERCIAL

## 2023-09-18 VITALS
DIASTOLIC BLOOD PRESSURE: 77 MMHG | OXYGEN SATURATION: 99 % | WEIGHT: 174 LBS | SYSTOLIC BLOOD PRESSURE: 117 MMHG | BODY MASS INDEX: 28.96 KG/M2 | HEART RATE: 78 BPM

## 2023-09-18 DIAGNOSIS — I71.21 THORACIC ASCENDING AORTIC ANEURYSM (H): ICD-10-CM

## 2023-09-18 DIAGNOSIS — I10 BENIGN ESSENTIAL HYPERTENSION: ICD-10-CM

## 2023-09-18 DIAGNOSIS — J84.9 ILD (INTERSTITIAL LUNG DISEASE) (H): ICD-10-CM

## 2023-09-18 DIAGNOSIS — M35.3 PMR (POLYMYALGIA RHEUMATICA) (H): ICD-10-CM

## 2023-09-18 DIAGNOSIS — R00.2 PALPITATIONS: Primary | ICD-10-CM

## 2023-09-18 DIAGNOSIS — E03.8 OTHER SPECIFIED HYPOTHYROIDISM: ICD-10-CM

## 2023-09-18 DIAGNOSIS — I71.21 ANEURYSM OF ASCENDING AORTA WITHOUT RUPTURE (H): ICD-10-CM

## 2023-09-18 LAB
ALBUMIN SERPL BCG-MCNC: 4.5 G/DL (ref 3.5–5.2)
ALP SERPL-CCNC: 102 U/L (ref 35–104)
ALT SERPL W P-5'-P-CCNC: 10 U/L (ref 0–50)
ANION GAP SERPL CALCULATED.3IONS-SCNC: 9 MMOL/L (ref 7–15)
AST SERPL W P-5'-P-CCNC: 21 U/L (ref 0–45)
BILIRUB SERPL-MCNC: 0.5 MG/DL
BUN SERPL-MCNC: 15.2 MG/DL (ref 8–23)
CALCIUM SERPL-MCNC: 9.7 MG/DL (ref 8.8–10.2)
CHLORIDE SERPL-SCNC: 97 MMOL/L (ref 98–107)
CHOLEST SERPL-MCNC: 179 MG/DL
CREAT SERPL-MCNC: 0.82 MG/DL (ref 0.51–0.95)
DEPRECATED HCO3 PLAS-SCNC: 25 MMOL/L (ref 22–29)
EGFRCR SERPLBLD CKD-EPI 2021: 72 ML/MIN/1.73M2
GLUCOSE SERPL-MCNC: 89 MG/DL (ref 70–99)
HDLC SERPL-MCNC: 52 MG/DL
LDLC SERPL CALC-MCNC: 112 MG/DL
LDLC SERPL DIRECT ASSAY-MCNC: 112 MG/DL
LVEF ECHO: NORMAL
NONHDLC SERPL-MCNC: 127 MG/DL
POTASSIUM SERPL-SCNC: 4.6 MMOL/L (ref 3.4–5.3)
PROT SERPL-MCNC: 7.5 G/DL (ref 6.4–8.3)
SODIUM SERPL-SCNC: 131 MMOL/L (ref 136–145)
TRIGL SERPL-MCNC: 77 MG/DL

## 2023-09-18 PROCEDURE — 99000 SPECIMEN HANDLING OFFICE-LAB: CPT | Performed by: PATHOLOGY

## 2023-09-18 PROCEDURE — 80061 LIPID PANEL: CPT | Performed by: PATHOLOGY

## 2023-09-18 PROCEDURE — 93010 ELECTROCARDIOGRAM REPORT: CPT | Performed by: INTERNAL MEDICINE

## 2023-09-18 PROCEDURE — 80053 COMPREHEN METABOLIC PANEL: CPT | Performed by: PATHOLOGY

## 2023-09-18 PROCEDURE — 99213 OFFICE O/P EST LOW 20 MIN: CPT | Mod: 25 | Performed by: INTERNAL MEDICINE

## 2023-09-18 PROCEDURE — 99213 OFFICE O/P EST LOW 20 MIN: CPT | Performed by: INTERNAL MEDICINE

## 2023-09-18 PROCEDURE — 83721 ASSAY OF BLOOD LIPOPROTEIN: CPT | Performed by: INTERNAL MEDICINE

## 2023-09-18 PROCEDURE — 93306 TTE W/DOPPLER COMPLETE: CPT | Performed by: INTERNAL MEDICINE

## 2023-09-18 PROCEDURE — 93005 ELECTROCARDIOGRAM TRACING: CPT

## 2023-09-18 PROCEDURE — 36415 COLL VENOUS BLD VENIPUNCTURE: CPT | Performed by: PATHOLOGY

## 2023-09-18 RX ORDER — AMLODIPINE BESYLATE 5 MG/1
5 TABLET ORAL AT BEDTIME
Qty: 90 TABLET | Refills: 3 | Status: SHIPPED | OUTPATIENT
Start: 2023-09-18 | End: 2024-01-09

## 2023-09-18 RX ORDER — SPIRONOLACTONE 25 MG/1
12.5 TABLET ORAL DAILY
Qty: 45 TABLET | Refills: 3 | Status: SHIPPED | OUTPATIENT
Start: 2023-09-18 | End: 2024-01-09

## 2023-09-18 ASSESSMENT — PAIN SCALES - GENERAL: PAINLEVEL: NO PAIN (0)

## 2023-09-18 NOTE — PATIENT INSTRUCTIONS
September 18, 2023    Cardiology Provider You Saw During Your Visit: Dr. Warren      Medication Changes: Start taking your amlodipine in the evening/before bedtime      Follow Up:   -14 day heart monitor placed today  -Follow up with Dr. Warren in 1 year with fasting labs prior to visit      Follow the American Heart Association Diet and Lifestyle Recommendations:  -Limit saturated fat, trans fat, sodium, red meat, sweets and sugar-sweetened beverages. If you choose to eat red meat, compare labels and select the leanest cuts available.  -Aim for at least 150 minutes of moderate physical activity or 75 minutes of vigorous physical activity - or an equal combination of both - each week.      To Reach Us:  -During business hours: [phone], press option # 1 to schedule an appointment or to leave a message for your care team.     -After hours, weekends or holidays: [phone], press option #4 and ask to speak to the on-call cardiologist. Inform them you are a patient at the [facility].        **If you have a cardiac device, please make sure you schedule an in-person device check just prior to your cardiology provider appointments**        Lenore Villa RN  Cardiology Care Coordinator - General Cardiology  [facility]

## 2023-09-18 NOTE — NURSING NOTE
September 18, 2023    Medication Changes: Start taking your amlodipine in the evening/before bedtime      Follow Up:   -14 day heart monitor placed today  -Follow up with Dr. Warren in 1 year with fasting labs prior to visit      Patient verbalized understanding of all health information given and agreed to call with further questions or concerns.      Lenore Villa RN

## 2023-09-18 NOTE — PROGRESS NOTES
HPI: I had the privilege to evaluate Mrs Bernadette Coffey, who is a 78 yr female with a past medical history of PMR (polymyalgia rheumatica) (HCC), Interstitial lung disease (HCC), Severe obesity (BMI 35.0-39.9) with comorbidity (HCC), and hypertension. She also has history of hypothyroidism, but this is managed by endocrinology.  She has history of vitamin B12 deficiancy and receives injections for this.     The reason of her visit today is that she feels palpitations and that sometimes she has an irregular heart rate.  She denies chest pain, shortness of breath, intermittent claudication.        PAST MEDICAL HISTORY:  PMR (polymyalgia rheumatica)   Interstitial lung disease   Hypothyroidism  Glaucoma        CURRENT MEDICATIONS:  Current Outpatient Medications   Medication Sig Dispense Refill    albuterol (PROAIR HFA/PROVENTIL HFA/VENTOLIN HFA) 108 (90 Base) MCG/ACT inhaler Inhale 1-2 puffs into the lungs      alendronate (FOSAMAX) 70 MG tablet Take 70 mg by mouth      amLODIPine (NORVASC) 5 MG tablet Take 1 tablet (5 mg) by mouth daily 90 tablet 3    famotidine (PEPCID) 40 MG tablet Take 40 mg by mouth      fluticasone (FLONASE) 50 MCG/ACT nasal spray Spray 2 sprays in nostril      fluticasone (FLOVENT HFA) 110 MCG/ACT inhaler Inhale 1 puff into the lungs      folic acid (FOLVITE) 1 MG tablet Take 1 mg by mouth      ketoconazole (NIZORAL) 2 % external cream       latanoprost (XALATAN) 0.005 % ophthalmic solution Apply 1 drop to eye      levothyroxine (SYNTHROID/LEVOTHROID) 175 MCG tablet Take 175 mcg by mouth      methotrexate 2.5 MG tablet Take 20 mg by mouth      omeprazole (PRILOSEC OTC) 20 MG EC tablet Take 40 mg by mouth      predniSONE (DELTASONE) 1 MG tablet Take 3 mg by mouth      spironolactone (ALDACTONE) 25 MG tablet Take 0.5 tablets (12.5 mg) by mouth daily 45 tablet 3    cholecalciferol 25 MCG (1000 UT) TABS Take 1,000 Units by mouth (Patient not taking: Reported on 9/19/2022)       Patient takes  levothyroxine 150 mcg/day  Patient does not take prednisone       Surgical History: Patient  has a past surgical history that includes colonoscopy (01/01/2014); Breast lumpectomy; lumbar discectomy; total hip replacement (Right); Cystoscopy (N/A, 3/4/2019); eye muscle surgery; Cataract removal; Eye surgery; and Tubal ligation.      Family History: Patient's She was adopted. Family history is unknown by patient.      Social History: Patient  reports that she has never smoked.         ALLERGIES     Allergies   Allergen Reactions    Shellfish-Derived Products        SOCIAL HISTORY:  Social History     Socioeconomic History    Marital status:      Spouse name: None    Number of children: None    Years of education: None    Highest education level: None   Tobacco Use    Smoking status: Never    Smokeless tobacco: Never       ROS:   Constitutional: No fever, chills, or sweats. No weight gain/loss   ENT: No visual disturbance, ear ache, epistaxis, sore throat  Allergies/Immunologic: Negative.   Respiratory: No cough, hemoptysia  Cardiovascular: As per HPI  GI: No nausea, vomiting, hematemesis, melena, or hematochezia  : No urinary frequency, dysuria, or hematuria  Integument: Negative  Psychiatric: Negative  Neuro: Negative  Endocrinology: Negative   Musculoskeletal: Negative    EXAM:  /77 (BP Location: Left arm, Patient Position: Supine, Cuff Size: Adult Large)   Pulse 78   Wt 78.9 kg (174 lb)   SpO2 99%   BMI 28.96 kg/m    In general, the patient is a pleasant female in no apparent distress.    HEENT: NC/AT.  PERRLA.  EOMI.  Sclerae white, not injected.  Nares clear.  Pharynx without erythema or exudate.  Dentition intact.    Neck: No adenopathy.  No thyromegaly. Carotids +4/4 bilaterally without bruits.  No jugular venous distension.   Heart: RRR. Normal S1, S2 splits physiologically. No murmur, rub, click, or gallop. The PMI is in the 5th ICS in the midclavicular line. There is no heave.    Lungs:  CTA.  No ronchi, wheezes, rales.  No dullness to percussion.   Abdomen: Soft, nontender, nondistended. No organomegaly.  No bruits.   Extremities: No clubbing, cyanosis, or edema.  The pulses are +4/4 at the radial, brachial, femoral, popliteal, DP, and PT sites bilaterally.  No bruits are noted.  Neurologic: Alert and oriented to person/place/time, normal speech, gait and affect  Skin: No petechiae, purpura or rash.    Labs:  LIPID RESULTS:  Lab Results   Component Value Date    CHOL 179 09/18/2023    HDL 52 09/18/2023     (H) 09/18/2023    TRIG 77 09/18/2023    NHDL 127 09/18/2023       LIVER ENZYME RESULTS:  Lab Results   Component Value Date    AST 21 09/18/2023    ALT 10 09/18/2023           BMP RESULTS:  Lab Results   Component Value Date     (L) 09/18/2023    POTASSIUM 4.6 09/18/2023    CHLORIDE 97 (L) 09/18/2023    CO2 25 09/18/2023    ANIONGAP 9 09/18/2023    GLC 89 09/18/2023    BUN 15.2 09/18/2023    CR 0.82 09/18/2023    GFRESTIMATED 72 09/18/2023    JAZMÍN 9.7 09/18/2023            Procedures:    EKG: sin rhythm, premature ventricular and supraventricular ectopic beats      Echocardiogram  Global and regional left ventricular function is normal with an EF of 60-65%.  Left ventricular wall thickness is normal. Left ventricular size is normal.  Left ventricular diastolic function is normal. No regional wall motion  abnormalities are seen.     Right Ventricle  Right ventricular function, chamber size, wall motion, and thickness are  normal.     Atria  Both atria appear normal.     Mitral Valve  The mitral valve is normal. Trace mitral insufficiency is present.     Aortic Valve  Aortic valve is normal in structure and function.     Tricuspid Valve  The tricuspid valve is normal. Trace tricuspid insufficiency is present.     Pulmonic Valve  The pulmonic valve is normal. Trace pulmonic insufficiency is present.     Vessels  The pulmonary artery is normal. The inferior vena cava is normal.  Ascending  aorta 4.3 cm.     Pericardium  No pericardial effusion is present.     Compared to Previous Study  There has been no change.  ______________________________________________________________________________  MMode/2D Measurements & Calculations  IVSd: 0.95 cm     LVIDd: 4.2 cm  LVIDs: 2.8 cm  LVPWd: 1.1 cm  FS: 33.3 %  LV mass(C)d: 142.9 grams  LV mass(C)dI: 74.7 grams/m2  Ao root diam: 2.9 cm  asc Aorta Diam: 4.3 cm  LVOT diam: 2.0 cm  LVOT area: 3.0 cm2  Ao root diam index Ht(cm/m): 1.7  Ao root diam index BSA (cm/m2): 1.5  Asc Ao diam index BSA (cm/m2): 2.2  Asc Ao diam index Ht(cm/m): 2.6  RWT: 0.53  TAPSE: 2.6 cm     Doppler Measurements & Calculations  MV E max german: 78.0 cm/sec  MV A max german: 101.1 cm/sec  MV E/A: 0.77  MV dec slope: 273.4 cm/sec2  MV dec time: 0.29 sec     PA acc time: 0.11 sec  PI end-d german: 82.8 cm/sec  TR max german: 192.7 cm/sec  TR max P.9 mmHg  E/E' av.5  Lateral E/e': 7.2  Medial E/e': 9.8  RV S German: 18.2 cm/sec      Assessment and Plan:     We discussed the results with patient:  We discussed the importance of a heart healthy diet and lifestyle.  We discussed following items:    Medication Changes: Start taking your amlodipine in the evening/before bedtime     Follow Up:   -14 day heart monitor placed today  -Follow up with Dr. Warren in 1 year with fasting labs prior to visit     Follow the American Heart Association Diet and Lifestyle Recommendations:  -Limit saturated fat, trans fat, sodium, red meat, sweets and sugar-sweetened beverages. If you choose to eat red meat, compare labels and select the leanest cuts available.    Cy Warren MD, PhD  Professor of Medicine  Division of Cardiology       CC  Patient Care Team:  Geetha Koch NP as PCP - General  Geetha Koch NP as Referring Physician  Cy Warren MD as MD (Cardiovascular Disease)  Donna Barrett MD as MD (Rheumatology)  Cy Warren MD as Assigned Heart and Vascular  Provider  Erika Moser PA-C as Hospitalist (Cardiovascular Disease)  Erika Moser PA-C as Hospitalist (Cardiovascular Disease)  Lenore Villa, RN as Specialty Care Coordinator (Cardiology)  GEM LAMAR

## 2023-09-18 NOTE — NURSING NOTE
Chief Complaint   Patient presents with    Follow Up     Briana F/U     Vitals were taken, medications reconciled, and EKG was performed.    Prem Ferris, EMT  4:00 PM

## 2023-09-18 NOTE — LETTER
9/18/2023      RE: Bernadette Coffey  [address]  [facility] [address]       Dear Colleague,    Thank you for the opportunity to participate in the care of your patient, Bernadette Coffey, at the [facility]. Please see a copy of my visit note below.    HPI: I had the privilege to evaluate Mrs Bernadette Coffey, who is a 78 yr female with a past medical history of PMR (polymyalgia rheumatica) (HCC), Interstitial lung disease (HCC), Severe obesity (BMI 35.0-39.9) with comorbidity (HCC), and hypertension. She also has history of hypothyroidism, but this is managed by endocrinology.  She has history of vitamin B12 deficiancy and receives injections for this.     The reason of her visit today is that she feels palpitations and that sometimes she has an irregular heart rate.  She denies chest pain, shortness of breath, intermittent claudication.        PAST MEDICAL HISTORY:  PMR (polymyalgia rheumatica)   Interstitial lung disease   Hypothyroidism  Glaucoma        CURRENT MEDICATIONS:  Current Outpatient Medications   Medication Sig Dispense Refill    albuterol (PROAIR HFA/PROVENTIL HFA/VENTOLIN HFA) 108 (90 Base) MCG/ACT inhaler Inhale 1-2 puffs into the lungs      alendronate (FOSAMAX) 70 MG tablet Take 70 mg by mouth      amLODIPine (NORVASC) 5 MG tablet Take 1 tablet (5 mg) by mouth daily 90 tablet 3    famotidine (PEPCID) 40 MG tablet Take 40 mg by mouth      fluticasone (FLONASE) 50 MCG/ACT nasal spray Spray 2 sprays in nostril      fluticasone (FLOVENT HFA) 110 MCG/ACT inhaler Inhale 1 puff into the lungs      folic acid (FOLVITE) 1 MG tablet Take 1 mg by mouth      ketoconazole (NIZORAL) 2 % external cream       latanoprost (XALATAN) 0.005 % ophthalmic solution Apply 1 drop to eye      levothyroxine (SYNTHROID/LEVOTHROID) 175 MCG tablet Take 175 mcg by mouth      methotrexate 2.5 MG tablet Take 20 mg by mouth      omeprazole  (PRILOSEC OTC) 20 MG EC tablet Take 40 mg by mouth      predniSONE (DELTASONE) 1 MG tablet Take 3 mg by mouth      spironolactone (ALDACTONE) 25 MG tablet Take 0.5 tablets (12.5 mg) by mouth daily 45 tablet 3    cholecalciferol 25 MCG (1000 UT) TABS Take 1,000 Units by mouth (Patient not taking: Reported on 9/19/2022)       Patient takes levothyroxine 150 mcg/day  Patient does not take prednisone       Surgical History: Patient  has a past surgical history that includes colonoscopy (01/01/2014); Breast lumpectomy; lumbar discectomy; total hip replacement (Right); Cystoscopy (N/A, 3/4/2019); eye muscle surgery; Cataract removal; Eye surgery; and Tubal ligation.      Family History: Patient's She was adopted. Family history is unknown by patient.      Social History: Patient  reports that she has never smoked.         ALLERGIES     Allergies   Allergen Reactions    Shellfish-Derived Products        SOCIAL HISTORY:  Social History     Socioeconomic History    Marital status:      Spouse name: None    Number of children: None    Years of education: None    Highest education level: None   Tobacco Use    Smoking status: Never    Smokeless tobacco: Never       ROS:   Constitutional: No fever, chills, or sweats. No weight gain/loss   ENT: No visual disturbance, ear ache, epistaxis, sore throat  Allergies/Immunologic: Negative.   Respiratory: No cough, hemoptysia  Cardiovascular: As per HPI  GI: No nausea, vomiting, hematemesis, melena, or hematochezia  : No urinary frequency, dysuria, or hematuria  Integument: Negative  Psychiatric: Negative  Neuro: Negative  Endocrinology: Negative   Musculoskeletal: Negative    EXAM:  /77 (BP Location: Left arm, Patient Position: Supine, Cuff Size: Adult Large)   Pulse 78   Wt 78.9 kg (174 lb)   SpO2 99%   BMI 28.96 kg/m    In general, the patient is a pleasant female in no apparent distress.    HEENT: NC/AT.  PERRLA.  EOMI.  Sclerae white, not injected.  Nares clear.   Pharynx without erythema or exudate.  Dentition intact.    Neck: No adenopathy.  No thyromegaly. Carotids +4/4 bilaterally without bruits.  No jugular venous distension.   Heart: RRR. Normal S1, S2 splits physiologically. No murmur, rub, click, or gallop. The PMI is in the 5th ICS in the midclavicular line. There is no heave.    Lungs: CTA.  No ronchi, wheezes, rales.  No dullness to percussion.   Abdomen: Soft, nontender, nondistended. No organomegaly.  No bruits.   Extremities: No clubbing, cyanosis, or edema.  The pulses are +4/4 at the radial, brachial, femoral, popliteal, DP, and PT sites bilaterally.  No bruits are noted.  Neurologic: Alert and oriented to person/place/time, normal speech, gait and affect  Skin: No petechiae, purpura or rash.    Labs:  LIPID RESULTS:  Lab Results   Component Value Date    CHOL 179 09/18/2023    HDL 52 09/18/2023     (H) 09/18/2023    TRIG 77 09/18/2023    NHDL 127 09/18/2023       LIVER ENZYME RESULTS:  Lab Results   Component Value Date    AST 21 09/18/2023    ALT 10 09/18/2023           BMP RESULTS:  Lab Results   Component Value Date     (L) 09/18/2023    POTASSIUM 4.6 09/18/2023    CHLORIDE 97 (L) 09/18/2023    CO2 25 09/18/2023    ANIONGAP 9 09/18/2023    GLC 89 09/18/2023    BUN 15.2 09/18/2023    CR 0.82 09/18/2023    GFRESTIMATED 72 09/18/2023    JAZMÍN 9.7 09/18/2023            Procedures:    EKG: sin rhythm, premature ventricular and supraventricular ectopic beats      Echocardiogram  Global and regional left ventricular function is normal with an EF of 60-65%.  Left ventricular wall thickness is normal. Left ventricular size is normal.  Left ventricular diastolic function is normal. No regional wall motion  abnormalities are seen.     Right Ventricle  Right ventricular function, chamber size, wall motion, and thickness are  normal.     Atria  Both atria appear normal.     Mitral Valve  The mitral valve is normal. Trace mitral insufficiency is present.      Aortic Valve  Aortic valve is normal in structure and function.     Tricuspid Valve  The tricuspid valve is normal. Trace tricuspid insufficiency is present.     Pulmonic Valve  The pulmonic valve is normal. Trace pulmonic insufficiency is present.     Vessels  The pulmonary artery is normal. The inferior vena cava is normal. Ascending  aorta 4.3 cm.     Pericardium  No pericardial effusion is present.     Compared to Previous Study  There has been no change.  ______________________________________________________________________________  MMode/2D Measurements & Calculations  IVSd: 0.95 cm     LVIDd: 4.2 cm  LVIDs: 2.8 cm  LVPWd: 1.1 cm  FS: 33.3 %  LV mass(C)d: 142.9 grams  LV mass(C)dI: 74.7 grams/m2  Ao root diam: 2.9 cm  asc Aorta Diam: 4.3 cm  LVOT diam: 2.0 cm  LVOT area: 3.0 cm2  Ao root diam index Ht(cm/m): 1.7  Ao root diam index BSA (cm/m2): 1.5  Asc Ao diam index BSA (cm/m2): 2.2  Asc Ao diam index Ht(cm/m): 2.6  RWT: 0.53  TAPSE: 2.6 cm     Doppler Measurements & Calculations  MV E max german: 78.0 cm/sec  MV A max german: 101.1 cm/sec  MV E/A: 0.77  MV dec slope: 273.4 cm/sec2  MV dec time: 0.29 sec     PA acc time: 0.11 sec  PI end-d german: 82.8 cm/sec  TR max german: 192.7 cm/sec  TR max P.9 mmHg  E/E' av.5  Lateral E/e': 7.2  Medial E/e': 9.8  RV S German: 18.2 cm/sec      Assessment and Plan:     We discussed the results with patient:  We discussed the importance of a heart healthy diet and lifestyle.  We discussed following items:    Medication Changes: Start taking your amlodipine in the evening/before bedtime     Follow Up:   -14 day heart monitor placed today  -Follow up with Dr. Warren in 1 year with fasting labs prior to visit     Follow the American Heart Association Diet and Lifestyle Recommendations:  -Limit saturated fat, trans fat, sodium, red meat, sweets and sugar-sweetened beverages. If you choose to eat red meat, compare labels and select the leanest cuts available.        Please do not  hesitate to contact me if you have any questions/concerns.     Sincerely,     Cy Warren MD

## 2023-09-19 LAB
ATRIAL RATE - MUSE: 72 BPM
DIASTOLIC BLOOD PRESSURE - MUSE: NORMAL MMHG
INTERPRETATION ECG - MUSE: NORMAL
P AXIS - MUSE: 41 DEGREES
PR INTERVAL - MUSE: 188 MS
QRS DURATION - MUSE: 90 MS
QT - MUSE: 378 MS
QTC - MUSE: 413 MS
R AXIS - MUSE: -23 DEGREES
SYSTOLIC BLOOD PRESSURE - MUSE: NORMAL MMHG
T AXIS - MUSE: 49 DEGREES
VENTRICULAR RATE- MUSE: 72 BPM

## 2023-10-09 DIAGNOSIS — R00.2 PALPITATIONS: ICD-10-CM

## 2023-10-16 ENCOUNTER — TELEPHONE (OUTPATIENT)
Dept: CARDIOLOGY | Facility: CLINIC | Age: 78
End: 2023-10-16
Payer: COMMERCIAL

## 2023-10-16 DIAGNOSIS — E03.9 HYPOTHYROIDISM: ICD-10-CM

## 2023-10-16 DIAGNOSIS — R00.2 PALPITATIONS: Primary | ICD-10-CM

## 2023-10-16 NOTE — TELEPHONE ENCOUNTER
Left  for pt requesting she call back so we can review the following:   ----- Message -----  From: Cy Warren MD  Sent: 10/12/2023   7:20 AM CDT  To: Lenore Villa, AQUILES    Please let patiet know results ziopatch    Sin rhythm in general    She has more than 20 periods of SVT    She takes levothyroxine 175 - very high dosage-  patient need to contact her physician who prescribed this dosage - asking if it can lowered    If not    Would start with metoprolol 25 mg extended release - I start with low dosage to see how she tolerate it    Thanks    Cy Warren MD, PhD  Professor of Medicine  Division of Cardiology

## 2023-10-18 NOTE — TELEPHONE ENCOUNTER
Attempted to speak with pt again to review ziopatch heart monitor results and recommendations from Dr. Warren. No answer but left [facility] requesting pt call us back to discuss.     Madan Gutiérrez, RN   Cardiology Nurse Coordinator

## 2023-10-25 NOTE — TELEPHONE ENCOUNTER
Spoke with pt and discussed SVT episodes. Discussed that Dr. Warren believes high levothyroxine dose could be the cause of SVT episodes. Pt does want to confirm she is only taking 150 mg daily. I will adjust this in her chart. I will confirm this dose with Dr. Warren and ask if his recommendation is still to decrease dose first.     Madan Gutiérrez RN   Cardiology Nurse Coordinator

## 2023-10-26 RX ORDER — LEVOTHYROXINE SODIUM 150 UG/1
150 TABLET ORAL DAILY
Start: 2023-10-26

## 2023-10-26 RX ORDER — METOPROLOL SUCCINATE 25 MG/1
25 TABLET, EXTENDED RELEASE ORAL DAILY
Qty: 90 TABLET | Refills: 1 | Status: SHIPPED | OUTPATIENT
Start: 2023-10-26 | End: 2024-08-15

## 2023-10-26 NOTE — TELEPHONE ENCOUNTER
Cy Warren MD Proehl, Danielle L, RN  Caller: Unspecified (1 week ago)  Thanks    Start with metoprolol 25mg/d slow release    Called pt and confirmed that she is willing to start this medication. Pt will contact us if she has any symptoms after starting this medication or if her heart racing episodes do not improve after starting this. I also updated pt's Levothyroxine to the correct dose. Pt reports understanding and denies questions at this time.     Madan Gutiérrez RN   Cardiology Nurse Coordinator

## 2024-01-09 ENCOUNTER — TELEPHONE (OUTPATIENT)
Dept: CARDIOLOGY | Facility: CLINIC | Age: 79
End: 2024-01-09
Payer: COMMERCIAL

## 2024-01-09 DIAGNOSIS — I71.21 ANEURYSM OF ASCENDING AORTA WITHOUT RUPTURE (H): ICD-10-CM

## 2024-01-09 DIAGNOSIS — I10 BENIGN ESSENTIAL HYPERTENSION: ICD-10-CM

## 2024-01-09 RX ORDER — AMLODIPINE BESYLATE 5 MG/1
5 TABLET ORAL AT BEDTIME
Qty: 90 TABLET | Refills: 2 | Status: SHIPPED | OUTPATIENT
Start: 2024-01-09 | End: 2024-08-15

## 2024-01-09 RX ORDER — SPIRONOLACTONE 25 MG/1
12.5 TABLET ORAL DAILY
Qty: 45 TABLET | Refills: 2 | Status: SHIPPED | OUTPATIENT
Start: 2024-01-09

## 2024-01-09 NOTE — TELEPHONE ENCOUNTER
amLODIPine (NORVASC) 5 MG tablet   Take 1 tablet (5 mg) by mouth At Bedtime     Last Written Prescription Date:  9/18/23  Last Fill Quantity: 90,   # refills: 3  spironolactone (ALDACTONE) 25 MG tablet  sebastien 0.5 tablets (12.5 mg) by mouth daily   Last Written Prescription Date:  9/18/23  Last Fill Quantity: 45   # refills: 3  Last Office Visit : 9/18/23  Future Office visit:  One year     Refills should be on file. Remaining refills sent to requested pharmacy.     Provider who prescribed the medication: Cy Warren MD   Pharmacy:   [facility], [place] - [address]

## 2024-01-09 NOTE — TELEPHONE ENCOUNTER
[facility] Call Center    Phone Message    May a detailed message be left on voicemail: yes     Reason for Call: Medication Refill Request    Has the patient contacted the pharmacy for the refill? Yes   Name of medication being requested: a  amLODIPine (NORVASC) 5 MG tablet   spironolactone (ALDACTONE) 25 MG tablet   Provider who prescribed the medication: Cy Warren MD   Pharmacy:   [place] [address]     Date medication is needed: asap     Action Taken: Other: cardio    Travel Screening: Not Applicable    Thank you!  Specialty Access Center

## 2024-03-04 ENCOUNTER — TELEPHONE (OUTPATIENT)
Dept: CARDIOLOGY | Facility: CLINIC | Age: 79
End: 2024-03-04
Payer: COMMERCIAL

## 2024-03-04 NOTE — TELEPHONE ENCOUNTER
[facility] Call Center    Phone Message    May a detailed message be left on voicemail: yes     Reason for Call: Order(s): Other:   Reason for requested: Patient is requesting for lab orders to be placed as well as an EKG. Please place orders and call patient back to further coordinate.  Provider name: Briana    Action Taken: Message routed to:  Other: Cardiology    Travel Screening: Not Applicable    Thank you!  [facility]

## 2024-03-05 NOTE — TELEPHONE ENCOUNTER
3/5 Scheduled fasting lab prior to Dr. Warren appt on 9/12. Pt asked if appt can be sooner. I let her know she is due to follow-up w/ Dr. Warren 9/18 and is being seen sooner than she was due to see him, but added her to the waitlist anyway. MJ

## 2024-05-08 ENCOUNTER — MEDICAL CORRESPONDENCE (OUTPATIENT)
Dept: HEALTH INFORMATION MANAGEMENT | Facility: CLINIC | Age: 79
End: 2024-05-08
Payer: COMMERCIAL

## 2024-05-15 ENCOUNTER — TRANSCRIBE ORDERS (OUTPATIENT)
Dept: OTHER | Age: 79
End: 2024-05-15

## 2024-05-15 DIAGNOSIS — N39.0 RECURRENT UTI: Primary | ICD-10-CM

## 2024-06-03 ENCOUNTER — TELEPHONE (OUTPATIENT)
Dept: CARDIOLOGY | Facility: CLINIC | Age: 79
End: 2024-06-03
Payer: COMMERCIAL

## 2024-06-03 NOTE — TELEPHONE ENCOUNTER
6/3 Left Voicemail (1st Attempt) for the patient to call back and schedule the following:    Appointment type: Return Cardiology  Provider: rBiana or any GIRISH   Return date: 8/15  Specialty phone number: [phone] opt 1  Additional appointment(s) needed: fasting labs prior  Additonal Notes: if pt wants to be seen with Briana, reschedule manually on 8/15 with fasting labs prior

## 2024-06-05 ENCOUNTER — TELEPHONE (OUTPATIENT)
Dept: CARDIOLOGY | Facility: CLINIC | Age: 79
End: 2024-06-05
Payer: COMMERCIAL

## 2024-06-05 NOTE — TELEPHONE ENCOUNTER
6/5 Patient confirmed scheduled appointment:  Date: 8/15/2024  Time: 10:00 am  Visit type: Return Cardiology  Provider: Briana  Location: [facility]  Testing/imaging: fasting labs prior  Additional notes: N/A

## 2024-06-13 NOTE — TELEPHONE ENCOUNTER
MEDICAL RECORDS REQUEST   [facility]  Urology Clinic  [address] [place] [address]  PHONE: [phone]  Fax: [phone]        FUTURE VISIT INFORMATION                                                   Bernadette Coffey, : 1945 scheduled for future visit at [facility]    APPOINTMENT INFORMATION:  Date: 2024  Provider:  Mary Robert MD  Reason for Visit/Diagnosis: Recurrent UTI    REFERRAL INFORMATION:  Referring provider:  DAVE Lyons CNP @ [facility]      RECORDS REQUESTED FOR VISIT                                                     NOTES  STATUS/DETAILS   OFFICE NOTE from referring provider  yes, 2023 -- DAVE Lyons CNP @ [facility]   OFFICE NOTE from other specialist  yes   DISCHARGE REPORT from the ER  yes   MEDICATION LIST  yes   LABS     URINALYSIS (UA)  yes     PRE-VISIT CHECKLIST      Joint diagnostic appointment coordinated correctly          (ensure right order & amount of time) Yes   RECORD COLLECTION COMPLETE Yes

## 2024-06-14 ENCOUNTER — TELEPHONE (OUTPATIENT)
Dept: CARDIOLOGY | Facility: CLINIC | Age: 79
End: 2024-06-14
Payer: COMMERCIAL

## 2024-06-14 NOTE — TELEPHONE ENCOUNTER
Writer called pt. Pt reports she had pacemaker placed as she had been having fainting spells. Pt just wanted this documented in her chart. She states she intends to keep her device care in [place].

## 2024-06-14 NOTE — TELEPHONE ENCOUNTER
[facility] Call Center    Phone Message    May a detailed message be left on voicemail: yes     Reason for Call: Other: pts daughter is calling to report to care team pt had a pacemaker put in [place] [facility]       Action Taken: Other: cardiology     Travel Screening: Not Applicable         Thank you!  [facility] Center

## 2024-07-11 ENCOUNTER — PRE VISIT (OUTPATIENT)
Dept: UROLOGY | Facility: CLINIC | Age: 79
End: 2024-07-11
Payer: COMMERCIAL

## 2024-07-11 NOTE — TELEPHONE ENCOUNTER
Reason for visit: consult     Relevant information: recurrent UTI    Records/imaging/labs/orders: available    Pt called: No need for a call    At Rooming: collect urine, ask if PVR    Petr George  7/11/2024  2:01 AM

## 2024-08-09 ENCOUNTER — PRE VISIT (OUTPATIENT)
Dept: UROLOGY | Facility: CLINIC | Age: 79
End: 2024-08-09

## 2024-08-09 ENCOUNTER — OFFICE VISIT (OUTPATIENT)
Dept: UROLOGY | Facility: CLINIC | Age: 79
End: 2024-08-09
Attending: OBSTETRICS & GYNECOLOGY
Payer: COMMERCIAL

## 2024-08-09 VITALS
BODY MASS INDEX: 28.99 KG/M2 | DIASTOLIC BLOOD PRESSURE: 81 MMHG | SYSTOLIC BLOOD PRESSURE: 142 MMHG | HEART RATE: 73 BPM | WEIGHT: 174 LBS | OXYGEN SATURATION: 97 % | HEIGHT: 65 IN

## 2024-08-09 DIAGNOSIS — N95.2 VAGINAL ATROPHY: Primary | ICD-10-CM

## 2024-08-09 DIAGNOSIS — N81.6 RECTOCELE: ICD-10-CM

## 2024-08-09 DIAGNOSIS — R33.9 URINARY RETENTION: ICD-10-CM

## 2024-08-09 DIAGNOSIS — N81.4 UTEROVAGINAL PROLAPSE: ICD-10-CM

## 2024-08-09 DIAGNOSIS — R33.9 URINARY RETENTION: Primary | ICD-10-CM

## 2024-08-09 DIAGNOSIS — N39.0 RECURRENT UTI: ICD-10-CM

## 2024-08-09 DIAGNOSIS — N95.2 VAGINAL ATROPHY: ICD-10-CM

## 2024-08-09 DIAGNOSIS — N81.11 CYSTOCELE, MIDLINE: ICD-10-CM

## 2024-08-09 PROCEDURE — 51798 US URINE CAPACITY MEASURE: CPT | Performed by: OBSTETRICS & GYNECOLOGY

## 2024-08-09 PROCEDURE — 99204 OFFICE O/P NEW MOD 45 MIN: CPT | Mod: 25 | Performed by: OBSTETRICS & GYNECOLOGY

## 2024-08-09 PROCEDURE — 51702 INSERT TEMP BLADDER CATH: CPT | Performed by: OBSTETRICS & GYNECOLOGY

## 2024-08-09 RX ORDER — ESTRADIOL 0.1 MG/G
1 CREAM VAGINAL
Qty: 42.5 G | Refills: 3 | Status: SHIPPED | OUTPATIENT
Start: 2024-08-09

## 2024-08-09 ASSESSMENT — PAIN SCALES - GENERAL: PAINLEVEL: NO PAIN (0)

## 2024-08-09 NOTE — PROGRESS NOTES
Catheter insertion documentation on 8/9/2024:    Bernadette Coffey presents to the clinic for catheter insertion.  Reason for insertion: urinary retention  Order has been verified. yes  Catheter successfully inserted into the urethral meatus in the usual sterile fashion without immediate complication.  Type of catheter placed: 14 Burmese indwelling catheter  Urine is yellow in color.  450 cc's of urine output returned.  Balloon was filled with 8cc's of normal saline.  Securement device placed for the catheter.  The patient tolerated the procedure and was instructed to monitor for pain or discomfort, return or call for pain, fever, leakage or decreased urine flow, watch for signs of infection, to continue with her ordered estrogen cream and follow up with Dr. Mary Robert MD in 4 weeks    Catheter care teaching was completed. Patient shown and verbally talked through care instructions for her nath catheter supplies.    Jessica Leyva RN

## 2024-08-09 NOTE — PROGRESS NOTES
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 She had anterior colporrhaphy for cystocele a year ago and was also started on daily macrobid UTI prophylaxis after which time she has not had any UTI symptoms.   No recent urine study or urologic imaging in the last year in our records including care everywhere. .      Urinary Symptoms/Voiding function  Rare stress leaks with chronic coughing. Has urgency leaks mostly at night ( wears apad), occasionally urge leaks during the day if she is holding her bladder longer. Voids every 2-3 hours during the day and 1-2 times at night. No dysuria/gross hematuria    Pelvic Organ Prolapse Symptoms  Denies vaginal bulge, pressure sensation or protrusion.      Gastrointestinal Symptoms:  Denies chronic diarrhea, constipation. Denies bothersome fecal or flatal incontinence. Denies defecatory difficulties.     Sexual function/Pelvic floor pain/GYN:   Not sexually active ( partner reasons).       Relevant Medical History:    Diabetes? no  High Blood pressure? yes     Recurrent UTIs? Yes ( resolved it seems)  Sleep Apnea? no  Obesity? Body mass index is 28.96 kg/m .  History of Blood clots? no  Other medical problems: none    Surgical History:      No past surgical history on file.    OB/Gyn History:  OB History   No obstetric history on file.       Medications/Vitamins/Supplements:   Current Outpatient Medications   Medication Sig Dispense Refill    albuterol (PROAIR HFA/PROVENTIL HFA/VENTOLIN HFA) 108 (90 Base) MCG/ACT inhaler Inhale 1-2 puffs into the lungs      fluticasone (FLOVENT HFA) 110 MCG/ACT  inhaler Inhale 1 puff into the lungs      folic acid (FOLVITE) 1 MG tablet Take 1 mg by mouth      ketoconazole (NIZORAL) 2 % external cream       levothyroxine (SYNTHROID/LEVOTHROID) 150 MCG tablet Take 1 tablet (150 mcg) by mouth daily      methotrexate 2.5 MG tablet Take 20 mg by mouth      predniSONE (DELTASONE) 1 MG tablet Take 3 mg by mouth      spironolactone (ALDACTONE) 25 MG tablet Take 0.5 tablets (12.5 mg) by mouth daily 45 tablet 2    alendronate (FOSAMAX) 70 MG tablet Take 70 mg by mouth (Patient not taking: Reported on 8/9/2024)      amLODIPine (NORVASC) 5 MG tablet Take 1 tablet (5 mg) by mouth at bedtime (Patient not taking: Reported on 8/9/2024) 90 tablet 2    cholecalciferol 25 MCG (1000 UT) TABS Take 1,000 Units by mouth (Patient not taking: Reported on 9/19/2022)      famotidine (PEPCID) 40 MG tablet Take 40 mg by mouth (Patient not taking: Reported on 8/9/2024)      fluticasone (FLONASE) 50 MCG/ACT nasal spray Spray 2 sprays in nostril (Patient not taking: Reported on 8/9/2024)      latanoprost (XALATAN) 0.005 % ophthalmic solution Apply 1 drop to eye (Patient not taking: Reported on 8/9/2024)      metoprolol succinate ER (TOPROL XL) 25 MG 24 hr tablet Take 1 tablet (25 mg) by mouth daily (Patient not taking: Reported on 8/9/2024) 90 tablet 1    omeprazole (PRILOSEC OTC) 20 MG EC tablet Take 40 mg by mouth (Patient not taking: Reported on 8/9/2024)       No current facility-administered medications for this visit.         Medical History:      No past medical history on file.  ROS  Social History    Social History     Socioeconomic History    Marital status:      Spouse name: Not on file    Number of children: Not on file    Years of education: Not on file    Highest education level: Not on file   Occupational History    Not on file   Tobacco Use    Smoking status: Never    Smokeless tobacco: Never   Substance and Sexual Activity    Alcohol use: Not on file    Drug use: Not on file     Sexual activity: Not on file   Other Topics Concern    Not on file   Social History Narrative    Not on file     Social Determinants of Health     Financial Resource Strain: Low Risk  (9/6/2023)    Received from [facility]    Overall Financial Resource Strain (CARDIA)     Difficulty of Paying Living Expenses: Not hard at all   Food Insecurity: No Food Insecurity (9/6/2023)    Received from [facility]    Hunger Vital Sign     Worried About Running Out of Food in the Last Year: Never true     Ran Out of Food in the Last Year: Never true   Transportation Needs: No Transportation Needs (9/6/2023)    Received from [facility]    PRAPARE - Transportation     Lack of Transportation (Medical): No     Lack of Transportation (Non-Medical): No   Physical Activity: Inactive (9/6/2023)    Received from [facility]    Exercise Vital Sign     Days of Exercise per Week: 0 days     Minutes of Exercise per Session: 0 min   Stress: Stress Concern Present (9/6/2023)    Received from [facility]    [employer] - Occupational Stress Questionnaire     Feeling of Stress : To some extent   Social Connections: Moderately Isolated (9/6/2023)    Received from [facility]    Social Connection and Isolation Panel [NHANES]     Frequency of Communication with Friends and Family: More than three times a week     Frequency of Social Gatherings with Friends and Family: Never     Attends Shinto Services: Never     Active Member of Clubs or Organizations: No     Attends Club or Organization Meetings: Never     Marital Status:    Interpersonal Safety: Not At Risk (9/6/2023)    Received from [facility]    Humiliation, Afraid, Rape, and Kick questionnaire     Fear of  "Current or Ex-Partner: No     Emotionally Abused: No     Physically Abused: No     Sexually Abused: No   Housing Stability: Not on file       Family History  No family history on file.    Allergy    Allergies   Allergen Reactions    Shellfish-Derived Products        Current Outpatient Medications   Medication Sig Dispense Refill    albuterol (PROAIR HFA/PROVENTIL HFA/VENTOLIN HFA) 108 (90 Base) MCG/ACT inhaler Inhale 1-2 puffs into the lungs      alendronate (FOSAMAX) 70 MG tablet Take 70 mg by mouth      amLODIPine (NORVASC) 5 MG tablet Take 1 tablet (5 mg) by mouth at bedtime 90 tablet 2    cholecalciferol 25 MCG (1000 UT) TABS Take 1,000 Units by mouth (Patient not taking: Reported on 9/19/2022)      famotidine (PEPCID) 40 MG tablet Take 40 mg by mouth      fluticasone (FLONASE) 50 MCG/ACT nasal spray Spray 2 sprays in nostril      fluticasone (FLOVENT HFA) 110 MCG/ACT inhaler Inhale 1 puff into the lungs      folic acid (FOLVITE) 1 MG tablet Take 1 mg by mouth      ketoconazole (NIZORAL) 2 % external cream       latanoprost (XALATAN) 0.005 % ophthalmic solution Apply 1 drop to eye      levothyroxine (SYNTHROID/LEVOTHROID) 150 MCG tablet Take 1 tablet (150 mcg) by mouth daily      methotrexate 2.5 MG tablet Take 20 mg by mouth      metoprolol succinate ER (TOPROL XL) 25 MG 24 hr tablet Take 1 tablet (25 mg) by mouth daily 90 tablet 1    omeprazole (PRILOSEC OTC) 20 MG EC tablet Take 40 mg by mouth      predniSONE (DELTASONE) 1 MG tablet Take 3 mg by mouth      spironolactone (ALDACTONE) 25 MG tablet Take 0.5 tablets (12.5 mg) by mouth daily 45 tablet 2     No current facility-administered medications for this visit.       Physical Exam: BP (!) 142/81 (BP Location: Left arm, Patient Position: Sitting)   Pulse 73   Ht 1.651 m (5' 5\")   Wt 78.9 kg (174 lb)   SpO2 97%   BMI 28.96 kg/m      There were no vitals taken for this visit. No LMP recorded. There is no height or weight on file to calculate BMI.    Gen:  " "is alert, comfortable in no acute distress,   Abdomen: Abdomen is soft, non-tender, non-distended,   Lungs: non-labored breathing.     Pelvic Exam:   Normal external female genitalia. The urethra was Normal.    Vagina: stage 2 ant prolapse with some apical and post component. Mod to severe atrophy  Uterus: Normal size, non tender   Ovaries: No palpable mass   Vulva: No lesions, no pain   Rectal: Deferred     Pelvic floor strength: 3/5 kegels.    Pelvic floor muscles: No myalgia    POPQ EXAM FOR PROLAPSE SEVERITY  (Aa):   -1 (Ba):   -1 (C ):    -6   (GH):   3 (PB):  2 (TVL):  8   (Ap):   -1 (Bp):  -1 (D):   -5     ICS Stage (1-4):  2    Voiding trial:     mL by Bladder ultrasound  Confirmed with cath as well  Mathias placed.     Labs:   No results found for: \"COLOR\", \"APPEARANCE\", \"URINEGLC\", \"URINEBILI\", \"URINEKETONE\", \"SG\", \"URINEPH\", \"PROTEIN\", \"UROBILINOGEN\", \"NITRITE\", \"LEUKEST\"  CBC RESULTS: No results for input(s): \"WBC\", \"RBC\", \"HGB\", \"HCT\", \"MCV\", \"MCH\", \"MCHC\", \"RDW\", \"PLT\" in the last 20984 hours.  @[facility] [place]@    A/P: Bernadette Coffey is a 79 year old F with     Bernadette was seen today for recurrent uti.    Diagnoses and all orders for this visit:    Vaginal atrophy  -     estradiol (ESTRACE) 0.1 MG/GM vaginal cream; Place 1 g vaginally three times a week Ok to use your fingers or the applicator    Recurrent UTI  -     estradiol (ESTRACE) 0.1 MG/GM vaginal cream; Place 1 g vaginally three times a week Ok to use your fingers or the applicator    Cystocele, midline    Rectocele    Uterovaginal prolapse    Urinary retention  -     POST-VOID RESIDUAL BLADDER SCAN            Today we discussed doing the following    Treat the vaginal atrophy and recurrent UTI Using estrace vaginal cream three times a week before bedtime. I have ordered this for you. This will help reduce your risk of UTIs as well. After you use it for about 6 weeks, you can stop your daily antibiotics and just continue using the estrace " cream  Urinary retention likely due to the cystocele. She did not want to learn self cath today. Mathias placed. Will bring her back for pessary trial after treating her severe atrophy first for 4 weeks to optimize success with the pessary. If this doesn't work, will discuss surgery    2. Kegel exercises      I spent a total of 45 minutes with  Bernadette Coffey  on the date of the encounter in chart review, face to face patient visit, review of tests, documentation and/or discussion with other providers about the issues documented above.     Mary Robert MD, [facility]  , Department of OBGYN  Female Pelvic Medicine and Reconstructive Surgery ( Urogynecology)  CC  Patient Care Team:  Geetha Koch NP as PCP - General  Geetha Koch NP as Referring Physician  Cy Warren MD as MD (Cardiovascular Disease)  Donna Barrett MD as MD (Rheumatology)  Cy Warren MD as Assigned Heart and Vascular Provider  Erika Moser PA-C as Hospitalist (Cardiovascular Disease)  Erika Moser PA-C as Hospitalist (Cardiovascular Disease)  Lenore Villa RN as Specialty Care Coordinator (Cardiology)  Mary Robert MD as MD (OB/Gyn)  GEETHA KOCH

## 2024-08-09 NOTE — NURSING NOTE
"Chief Complaint   Patient presents with    Recurrent UTI       Blood pressure (!) 142/81, pulse 73, height 1.651 m (5' 5\"), weight 78.9 kg (174 lb), SpO2 97%. Body mass index is 28.96 kg/m .    There is no problem list on file for this patient.      Allergies   Allergen Reactions    Shellfish-Derived Products        Current Outpatient Medications   Medication Sig Dispense Refill    albuterol (PROAIR HFA/PROVENTIL HFA/VENTOLIN HFA) 108 (90 Base) MCG/ACT inhaler Inhale 1-2 puffs into the lungs      fluticasone (FLOVENT HFA) 110 MCG/ACT inhaler Inhale 1 puff into the lungs      folic acid (FOLVITE) 1 MG tablet Take 1 mg by mouth      ketoconazole (NIZORAL) 2 % external cream       levothyroxine (SYNTHROID/LEVOTHROID) 150 MCG tablet Take 1 tablet (150 mcg) by mouth daily      methotrexate 2.5 MG tablet Take 20 mg by mouth      predniSONE (DELTASONE) 1 MG tablet Take 3 mg by mouth      spironolactone (ALDACTONE) 25 MG tablet Take 0.5 tablets (12.5 mg) by mouth daily 45 tablet 2    alendronate (FOSAMAX) 70 MG tablet Take 70 mg by mouth (Patient not taking: Reported on 8/9/2024)      amLODIPine (NORVASC) 5 MG tablet Take 1 tablet (5 mg) by mouth at bedtime (Patient not taking: Reported on 8/9/2024) 90 tablet 2    cholecalciferol 25 MCG (1000 UT) TABS Take 1,000 Units by mouth (Patient not taking: Reported on 9/19/2022)      famotidine (PEPCID) 40 MG tablet Take 40 mg by mouth (Patient not taking: Reported on 8/9/2024)      fluticasone (FLONASE) 50 MCG/ACT nasal spray Spray 2 sprays in nostril (Patient not taking: Reported on 8/9/2024)      latanoprost (XALATAN) 0.005 % ophthalmic solution Apply 1 drop to eye (Patient not taking: Reported on 8/9/2024)      metoprolol succinate ER (TOPROL XL) 25 MG 24 hr tablet Take 1 tablet (25 mg) by mouth daily (Patient not taking: Reported on 8/9/2024) 90 tablet 1    omeprazole (PRILOSEC OTC) 20 MG EC tablet Take 40 mg by mouth (Patient not taking: Reported on 8/9/2024)         Social " History     Tobacco Use    Smoking status: Never    Smokeless tobacco: Never       Jaz Zaragoza  8/9/2024  2:34 PM

## 2024-08-09 NOTE — LETTER
2024       RE: Bernadette Coffey  [address]  [facility] [address]     Dear Colleague,    Thank you for referring your patient, Bernadette Coffey, to the [facility]OLOG[facility] [place] at [facility]. Please see a copy of my visit note below.    2024    Referring Provider: Geetha Koch NP  [address] [place] [address]    Primary Care Provider: Geetha Koch    CC: recurrent UTI    HPI:  Bernadette Coffey is a 79 year old  with medical hx significant for second Degree AV Block Type 2 (Complete Heart Block s/p pace maker on 24 ) , cHTN, polymyalgia rheumatica,  interstitial lung disease who presents for evaluation of recurrent UTI. She had anterior colporrhaphy for cystocele a year ago and was also started on daily macrobid UTI prophylaxis after which time she has not had any UTI symptoms.   No recent urine study or urologic imaging in the last year in our records including care everywhere. .      Urinary Symptoms/Voiding function  Rare stress leaks with chronic coughing. Has urgency leaks mostly at night ( wears apad), occasionally urge leaks during the day if she is holding her bladder longer. Voids every 2-3 hours during the day and 1-2 times at night. No dysuria/gross hematuria    Pelvic Organ Prolapse Symptoms  Denies vaginal bulge, pressure sensation or protrusion.      Gastrointestinal Symptoms:  Denies chronic diarrhea, constipation. Denies bothersome fecal or flatal incontinence. Denies defecatory difficulties.     Sexual function/Pelvic floor pain/GYN:   Not sexually active ( partner reasons).       Relevant Medical History:    Diabetes? no  High Blood pressure? yes     Recurrent UTIs? Yes ( resolved it seems)  Sleep Apnea? no  Obesity? Body mass index is 28.96 kg/m .  History of Blood clots? no  Other medical problems: none    Surgical History:      No past surgical history on file.    OB/Gyn  History:  OB History   No obstetric history on file.       Medications/Vitamins/Supplements:   Current Outpatient Medications   Medication Sig Dispense Refill     albuterol (PROAIR HFA/PROVENTIL HFA/VENTOLIN HFA) 108 (90 Base) MCG/ACT inhaler Inhale 1-2 puffs into the lungs       fluticasone (FLOVENT HFA) 110 MCG/ACT inhaler Inhale 1 puff into the lungs       folic acid (FOLVITE) 1 MG tablet Take 1 mg by mouth       ketoconazole (NIZORAL) 2 % external cream        levothyroxine (SYNTHROID/LEVOTHROID) 150 MCG tablet Take 1 tablet (150 mcg) by mouth daily       methotrexate 2.5 MG tablet Take 20 mg by mouth       predniSONE (DELTASONE) 1 MG tablet Take 3 mg by mouth       spironolactone (ALDACTONE) 25 MG tablet Take 0.5 tablets (12.5 mg) by mouth daily 45 tablet 2     alendronate (FOSAMAX) 70 MG tablet Take 70 mg by mouth (Patient not taking: Reported on 8/9/2024)       amLODIPine (NORVASC) 5 MG tablet Take 1 tablet (5 mg) by mouth at bedtime (Patient not taking: Reported on 8/9/2024) 90 tablet 2     cholecalciferol 25 MCG (1000 UT) TABS Take 1,000 Units by mouth (Patient not taking: Reported on 9/19/2022)       famotidine (PEPCID) 40 MG tablet Take 40 mg by mouth (Patient not taking: Reported on 8/9/2024)       fluticasone (FLONASE) 50 MCG/ACT nasal spray Spray 2 sprays in nostril (Patient not taking: Reported on 8/9/2024)       latanoprost (XALATAN) 0.005 % ophthalmic solution Apply 1 drop to eye (Patient not taking: Reported on 8/9/2024)       metoprolol succinate ER (TOPROL XL) 25 MG 24 hr tablet Take 1 tablet (25 mg) by mouth daily (Patient not taking: Reported on 8/9/2024) 90 tablet 1     omeprazole (PRILOSEC OTC) 20 MG EC tablet Take 40 mg by mouth (Patient not taking: Reported on 8/9/2024)       No current facility-administered medications for this visit.         Medical History:      No past medical history on file.  ROS  Social History    Social History     Socioeconomic History     Marital status:       Spouse name: Not on file     Number of children: Not on file     Years of education: Not on file     Highest education level: Not on file   Occupational History     Not on file   Tobacco Use     Smoking status: Never     Smokeless tobacco: Never   Substance and Sexual Activity     Alcohol use: Not on file     Drug use: Not on file     Sexual activity: Not on file   Other Topics Concern     Not on file   Social History Narrative     Not on file     Social Determinants of Health     Financial Resource Strain: Low Risk  (9/6/2023)    Received from [facility] [employer] [facility] [employer] [facility]    Overall Financial Resource Strain (CARDIA)      Difficulty of Paying Living Expenses: Not hard at all   Food Insecurity: No Food Insecurity (9/6/2023)    Received from [facility] [employer] [facility]    Hunger Vital Sign      Worried About Running Out of Food in the Last Year: Never true      Ran Out of Food in the Last Year: Never true   Transportation Needs: No Transportation Needs (9/6/2023)    Received from [facility] [employer] [facility]    PRAPARE - Transportation      Lack of Transportation (Medical): No      Lack of Transportation (Non-Medical): No   Physical Activity: Inactive (9/6/2023)    Received from [facility] [employer] [facility]    Exercise Vital Sign      Days of Exercise per Week: 0 days      Minutes of Exercise per Session: 0 min   Stress: Stress Concern Present (9/6/2023)    [facility] [employer] [facility]    [place] of [employer]ational [employer] - Occupational Stress Questionnaire      Feeling of Stress : To some extent   Social Connections: Moderately Isolated (9/6/2023)    Received from [facility] [employer] [facility]    Social Connection and Isolation Panel [NHANES]      Frequency of Communication with Friends and Family: More than three times a week      Frequency of Social Gatherings with Friends and  Family: Never      Attends Adventism Services: Never      Active Member of Clubs or Organizations: No      Attends Club or Organization Meetings: Never      Marital Status:    Interpersonal Safety: Not At Risk (9/6/2023)    [facility] [employer]    Humiliation, Afraid, Rape, and Kick questionnaire      Fear of Current or Ex-Partner: No      Emotionally Abused: No      Physically Abused: No      Sexually Abused: No   Housing Stability: Not on file       Family History  No family history on file.    Allergy    Allergies   Allergen Reactions     Shellfish-Derived Products        Current Outpatient Medications   Medication Sig Dispense Refill     albuterol (PROAIR HFA/PROVENTIL HFA/VENTOLIN HFA) 108 (90 Base) MCG/ACT inhaler Inhale 1-2 puffs into the lungs       alendronate (FOSAMAX) 70 MG tablet Take 70 mg by mouth       amLODIPine (NORVASC) 5 MG tablet Take 1 tablet (5 mg) by mouth at bedtime 90 tablet 2     cholecalciferol 25 MCG (1000 UT) TABS Take 1,000 Units by mouth (Patient not taking: Reported on 9/19/2022)       famotidine (PEPCID) 40 MG tablet Take 40 mg by mouth       fluticasone (FLONASE) 50 MCG/ACT nasal spray Spray 2 sprays in nostril       fluticasone (FLOVENT HFA) 110 MCG/ACT inhaler Inhale 1 puff into the lungs       folic acid (FOLVITE) 1 MG tablet Take 1 mg by mouth       ketoconazole (NIZORAL) 2 % external cream        latanoprost (XALATAN) 0.005 % ophthalmic solution Apply 1 drop to eye       levothyroxine (SYNTHROID/LEVOTHROID) 150 MCG tablet Take 1 tablet (150 mcg) by mouth daily       methotrexate 2.5 MG tablet Take 20 mg by mouth       metoprolol succinate ER (TOPROL XL) 25 MG 24 hr tablet Take 1 tablet (25 mg) by mouth daily 90 tablet 1     omeprazole (PRILOSEC OTC) 20 MG EC tablet Take 40 mg by mouth       predniSONE (DELTASONE) 1 MG tablet Take 3 mg by mouth       spironolactone (ALDACTONE) 25 MG tablet Take 0.5 tablets (12.5 mg) by mouth daily  "45 tablet 2     No current facility-administered medications for this visit.       Physical Exam: BP (!) 142/81 (BP Location: Left arm, Patient Position: Sitting)   Pulse 73   Ht 1.651 m (5' 5\")   Wt 78.9 kg (174 lb)   SpO2 97%   BMI 28.96 kg/m      There were no vitals taken for this visit. No LMP recorded. There is no height or weight on file to calculate BMI.    Gen:  is alert, comfortable in no acute distress,   Abdomen: Abdomen is soft, non-tender, non-distended,   Lungs: non-labored breathing.     Pelvic Exam:   Normal external female genitalia. The urethra was Normal.    Vagina: stage 2 ant prolapse with some apical and post component. Mod to severe atrophy  Uterus: Normal size, non tender   Ovaries: No palpable mass   Vulva: No lesions, no pain   Rectal: Deferred     Pelvic floor strength: 3/5 kegels.    Pelvic floor muscles: No myalgia    POPQ EXAM FOR PROLAPSE SEVERITY  (Aa):   -1 (Ba):   -1 (C ):    -6   (GH):   3 (PB):  2 (TVL):  8   (Ap):   -1 (Bp):  -1 (D):   -5     ICS Stage (1-4):  2    Voiding trial:     mL by Bladder ultrasound  Confirmed with cath as well  Mathias placed.     Labs:   No results found for: \"COLOR\", \"APPEARANCE\", \"URINEGLC\", \"URINEBILI\", \"URINEKETONE\", \"SG\", \"URINEPH\", \"PROTEIN\", \"UROBILINOGEN\", \"NITRITE\", \"LEUKEST\"  CBC RESULTS: No results for input(s): \"WBC\", \"RBC\", \"HGB\", \"HCT\", \"MCV\", \"MCH\", \"MCHC\", \"RDW\", \"PLT\" in the last 27513 hours.  @lastcmp@    A/P: Bernadette Coffey is a 79 year old F with     Bernadette was seen today for recurrent uti.    Diagnoses and all orders for this visit:    Vaginal atrophy  -     estradiol (ESTRACE) 0.1 MG/GM vaginal cream; Place 1 g vaginally three times a week Ok to use your fingers or the applicator    Recurrent UTI  -     estradiol (ESTRACE) 0.1 MG/GM vaginal cream; Place 1 g vaginally three times a week Ok to use your fingers or the applicator    Cystocele, midline    Rectocele    Uterovaginal prolapse    Urinary retention  -     " POST-VOID RESIDUAL BLADDER SCAN            Today we discussed doing the following    Treat the vaginal atrophy and recurrent UTI Using estrace vaginal cream three times a week before bedtime. I have ordered this for you. This will help reduce your risk of UTIs as well. After you use it for about 6 weeks, you can stop your daily antibiotics and just continue using the estrace cream  Urinary retention likely due to the cystocele. She did not want to learn self cath today. Mathias placed. Will bring her back for pessary trial after treating her severe atrophy first for 4 weeks to optimize success with the pessary. If this doesn't work, will discuss surgery    2. Kegel exercises      I spent a total of 45 minutes with  Bernadette GARBER Erlinda  on the date of the encounter in chart review, face to face patient visit, review of tests, documentation and/or discussion with other providers about the issues documented above.     Mary Robert MD, [facility]  , Department of OBGYN  Female Pelvic Medicine and Reconstructive Surgery ( Urogynecology)  CC  Patient Care Team:  Geetha Koch NP as PCP - General  Geetha Koch NP as Referring Physician  Cy Warren MD as MD (Cardiovascular Disease)  Donna Barrett MD as MD (Rheumatology)  Cy Warren MD as Assigned Heart and Vascular Provider  Erika Moser PA-C as Hospitalist (Cardiovascular Disease)  Erika Moser PA-C as Hospitalist (Cardiovascular Disease)  Lenore Villa, RN as Specialty Care Coordinator (Cardiology)  Mary Robert MD as MD (OB/Gyn)  GEETHA KOCH                Again, thank you for allowing me to participate in the care of your patient.      Sincerely,    Mary Robert MD

## 2024-08-09 NOTE — Clinical Note
Hi team, this patient had to go home with nath today for retention. She has some prolapse but also severe vaginal atrophy. So I instructed her to use estrace and to come back in 4 weeks so we can try pessary fitting. We can bring her to [facility] or [facility] ( where ever we have the capacity to see her). If pessary doesn't work, then we will replace her nath and I will discuss surgery with her. Please assist her  Thanks

## 2024-08-09 NOTE — PATIENT INSTRUCTIONS
Jacqui Fleming, great meeting you today    As we discussed you do have a mild uterovaginal prolapse with a cystocele and rectocele still . You also have vaginal atrophy ( dryness/irritation of vagina from lack of estrogen)    Today we discussed doing the following    Using estrace vaginal cream three times a week before bedtime. I have ordered this for you. This will help reduce your risk of UTIs as well. After you use it for about 6 weeks, you can stop your daily antibiotics and just continue using the estrace cream  2. Do 20 kegel exercises twice a day, do it with something else that you do twice a day ( like taking your medications) so you don't forget.     Mathias placed today. Follow up in 4 weeks for pessary fitting after treatment of severe atrophy with estrace cream.     Mary Robert MD on 8/9/2024 at 3:30 PM

## 2024-08-12 ENCOUNTER — TELEPHONE (OUTPATIENT)
Dept: OBGYN | Facility: CLINIC | Age: 79
End: 2024-08-12
Payer: COMMERCIAL

## 2024-08-12 DIAGNOSIS — I10 BENIGN ESSENTIAL HYPERTENSION: Primary | ICD-10-CM

## 2024-08-12 NOTE — TELEPHONE ENCOUNTER
"Received the following message from Dr. Robert:    \"Hi team, this patient had to go home with nath today for retention. She has some prolapse but also severe vaginal atrophy. So I instructed her to use estrace and to come back in 4 weeks so we can try pessary fitting. [facility] can [facility] or [facility] ( where ever we have the capacity to see her). If pessary doesn't work, then we will replace her nath and I will discuss surgery with her. Please assist her     Thanks\"    Called pt to offer appointment. Pt would not like to try a pessary, would just like to go straight to surgery. Scheduled for 9/10 for an in-person discussion of surgery. Pt will be due for a nath catheter replacement at this time, added onto RN schedule as well.  "

## 2024-08-15 ENCOUNTER — OFFICE VISIT (OUTPATIENT)
Dept: CARDIOLOGY | Facility: CLINIC | Age: 79
End: 2024-08-15
Attending: INTERNAL MEDICINE
Payer: COMMERCIAL

## 2024-08-15 ENCOUNTER — LAB (OUTPATIENT)
Dept: LAB | Facility: CLINIC | Age: 79
End: 2024-08-15
Attending: INTERNAL MEDICINE
Payer: COMMERCIAL

## 2024-08-15 VITALS
WEIGHT: 157.9 LBS | DIASTOLIC BLOOD PRESSURE: 99 MMHG | HEART RATE: 60 BPM | BODY MASS INDEX: 26.28 KG/M2 | SYSTOLIC BLOOD PRESSURE: 160 MMHG | OXYGEN SATURATION: 98 %

## 2024-08-15 DIAGNOSIS — I10 BENIGN ESSENTIAL HYPERTENSION: ICD-10-CM

## 2024-08-15 DIAGNOSIS — R00.2 PALPITATIONS: ICD-10-CM

## 2024-08-15 LAB
ALBUMIN SERPL BCG-MCNC: 4.1 G/DL (ref 3.5–5.2)
ALP SERPL-CCNC: 83 U/L (ref 40–150)
ALT SERPL W P-5'-P-CCNC: 12 U/L (ref 0–50)
ANION GAP SERPL CALCULATED.3IONS-SCNC: 9 MMOL/L (ref 7–15)
AST SERPL W P-5'-P-CCNC: 19 U/L (ref 0–45)
BILIRUB SERPL-MCNC: 0.4 MG/DL
BUN SERPL-MCNC: 16 MG/DL (ref 8–23)
CALCIUM SERPL-MCNC: 9.3 MG/DL (ref 8.8–10.4)
CHLORIDE SERPL-SCNC: 99 MMOL/L (ref 98–107)
CHOLEST SERPL-MCNC: 186 MG/DL
CREAT SERPL-MCNC: 0.84 MG/DL (ref 0.51–0.95)
EGFRCR SERPLBLD CKD-EPI 2021: 70 ML/MIN/1.73M2
FASTING STATUS PATIENT QL REPORTED: YES
FASTING STATUS PATIENT QL REPORTED: YES
GLUCOSE SERPL-MCNC: 85 MG/DL (ref 70–99)
HCO3 SERPL-SCNC: 27 MMOL/L (ref 22–29)
HDLC SERPL-MCNC: 55 MG/DL
LDLC SERPL CALC-MCNC: 101 MG/DL
LDLC SERPL DIRECT ASSAY-MCNC: 117 MG/DL
NONHDLC SERPL-MCNC: 131 MG/DL
POTASSIUM SERPL-SCNC: 4.4 MMOL/L (ref 3.4–5.3)
PROT SERPL-MCNC: 6.9 G/DL (ref 6.4–8.3)
SODIUM SERPL-SCNC: 135 MMOL/L (ref 135–145)
TRIGL SERPL-MCNC: 151 MG/DL

## 2024-08-15 PROCEDURE — 80053 COMPREHEN METABOLIC PANEL: CPT | Performed by: PATHOLOGY

## 2024-08-15 PROCEDURE — 99000 SPECIMEN HANDLING OFFICE-LAB: CPT | Performed by: PATHOLOGY

## 2024-08-15 PROCEDURE — 36415 COLL VENOUS BLD VENIPUNCTURE: CPT | Performed by: PATHOLOGY

## 2024-08-15 PROCEDURE — 99213 OFFICE O/P EST LOW 20 MIN: CPT | Mod: 25 | Performed by: INTERNAL MEDICINE

## 2024-08-15 PROCEDURE — 99213 OFFICE O/P EST LOW 20 MIN: CPT | Performed by: INTERNAL MEDICINE

## 2024-08-15 PROCEDURE — 80061 LIPID PANEL: CPT | Performed by: PATHOLOGY

## 2024-08-15 PROCEDURE — 83721 ASSAY OF BLOOD LIPOPROTEIN: CPT | Performed by: INTERNAL MEDICINE

## 2024-08-15 RX ORDER — AMLODIPINE BESYLATE 2.5 MG/1
2.5 TABLET ORAL DAILY PRN
Qty: 90 TABLET | Refills: 3 | Status: SHIPPED | OUTPATIENT
Start: 2024-08-15

## 2024-08-15 ASSESSMENT — PAIN SCALES - GENERAL: PAINLEVEL: NO PAIN (0)

## 2024-08-15 NOTE — PROGRESS NOTES
HPI:     I had the privilege to evaluate Mrs Bernadette Coffey, who is a 79 yr female with a past medical history of PMR (polymyalgia rheumatica) (HCC), Interstitial lung disease (HCC), Severe obesity (BMI 35.0-39.9) with comorbidity (HCC), and hypertension. She also has history of hypothyroidism, but this is managed by endocrinology.  She has history of vitamin B12 deficiancy and receives injections for this.      Patient had a PM implantation in [place].    Her major problem is that her blood pressure has progressively increased.    She denies chest pain, shortness of breath, intermittent claudication.    PAST MEDICAL HISTORY:  Past Medical History:   Diagnosis Date    History of anterior colporrhaphy 06/2023       CURRENT MEDICATIONS:  Current Outpatient Medications   Medication Sig Dispense Refill    albuterol (PROAIR HFA/PROVENTIL HFA/VENTOLIN HFA) 108 (90 Base) MCG/ACT inhaler Inhale 1-2 puffs into the lungs      estradiol (ESTRACE) 0.1 MG/GM vaginal cream Place 1 g vaginally three times a week Ok to use your fingers or the applicator 42.5 g 3    fluticasone (FLOVENT HFA) 110 MCG/ACT inhaler Inhale 1 puff into the lungs      folic acid (FOLVITE) 1 MG tablet Take 1 mg by mouth      ketoconazole (NIZORAL) 2 % external cream       levothyroxine (SYNTHROID/LEVOTHROID) 150 MCG tablet Take 1 tablet (150 mcg) by mouth daily      methotrexate 2.5 MG tablet Take 20 mg by mouth      predniSONE (DELTASONE) 1 MG tablet Take 3 mg by mouth      spironolactone (ALDACTONE) 25 MG tablet Take 0.5 tablets (12.5 mg) by mouth daily 45 tablet 2    alendronate (FOSAMAX) 70 MG tablet Take 70 mg by mouth (Patient not taking: Reported on 8/9/2024)      amLODIPine (NORVASC) 5 MG tablet Take 1 tablet (5 mg) by mouth at bedtime (Patient not taking: Reported on 8/9/2024) 90 tablet 2    cholecalciferol 25 MCG (1000 UT) TABS Take 1,000 Units by mouth (Patient not taking: Reported on 9/19/2022)      famotidine (PEPCID) 40 MG tablet Take 40 mg by  mouth (Patient not taking: Reported on 8/9/2024)      fluticasone (FLONASE) 50 MCG/ACT nasal spray Spray 2 sprays in nostril (Patient not taking: Reported on 8/9/2024)      latanoprost (XALATAN) 0.005 % ophthalmic solution Apply 1 drop to eye (Patient not taking: Reported on 8/9/2024)      metoprolol succinate ER (TOPROL XL) 25 MG 24 hr tablet Take 1 tablet (25 mg) by mouth daily (Patient not taking: Reported on 8/9/2024) 90 tablet 1    omeprazole (PRILOSEC OTC) 20 MG EC tablet Take 40 mg by mouth (Patient not taking: Reported on 8/9/2024)         PAST SURGICAL HISTORY:      ALLERGIES     Allergies   Allergen Reactions    Shellfish-Derived Products        FAMILY HISTORY:  No family history on file.    SOCIAL HISTORY:  Social History     Socioeconomic History    Marital status:      Spouse name: None    Number of children: None    Years of education: None    Highest education level: None   Tobacco Use    Smoking status: Never    Smokeless tobacco: Never     Social Determinants of Health     Financial Resource Strain: Low Risk  (12/6/2023)    Received from [facility]    Overall Financial Resource Strain (CARDIA)     Difficulty of Paying Living Expenses: Not hard at all   Food Insecurity: No Food Insecurity (6/12/2024)    Received from [facility]    Hunger Vital Sign     Worried About Running Out of Food in the Last Year: Never true     Ran Out of Food in the Last Year: Never true   Transportation Needs: No Transportation Needs (6/12/2024)    Received from [facility]    PRAPARE - Transportation     Lack of Transportation (Medical): No     Lack of Transportation (Non-Medical): No   Physical Activity: Inactive (9/6/2023)    [facility]    Exercise Vital Sign     Days of Exercise per Week: 0 days     Minutes of  Exercise per Session: 0 min   Stress: Stress Concern Present (9/6/2023)    [facility] [employer] [facility], [facility] [employer] [facility] [employer] [facility]    [place] of Occupational Health - Occupational Stress Questionnaire     Feeling of Stress : To some extent   Social Connections: Moderately Isolated (9/6/2023)    Received from [facility] [employer] [facility], [facility]    Social Connection and Isolation Panel [NHANES]     Frequency of Communication with Friends and Family: More than three times a week     Frequency of Social Gatherings with Friends and Family: Never     Attends Orthodoxy Services: Never     Active Member of Clubs or Organizations: No     Attends Club or Organization Meetings: Never     Marital Status:    Interpersonal Safety: Patient Declined (6/14/2024)    Received from [facility] [employer] [facility] [employer] [facility] [employer]     Do you feel UNSAFE in any of your personal relationships with your family members or any other acquaintances?: Deferred   Housing Stability: Low Risk  (6/12/2024)    [facility] [employer] [facility] [employer] [facility]    Housing Stability Vital Sign     Unable to Pay for Housing in the Last Year: No     Number of Times Moved in the Last Year: 0     Homeless in the Last Year: No       ROS:   Constitutional: No fever, chills, or sweats. No weight gain/loss   ENT: No visual disturbance, ear ache, epistaxis, sore throat  Allergies/Immunologic: Negative.   Respiratory: No cough, hemoptysia  Cardiovascular: As per HPI  GI: No nausea, vomiting, hematemesis, melena, or hematochezia  : No urinary frequency, dysuria, or hematuria  Integument: Negative  Psychiatric: Negative  Neuro: Negative  Endocrinology: Negative   Musculoskeletal: Negative    EXAM:  BP (!) 160/99 (BP Location: Left arm, Patient Position: Chair, Cuff Size: Adult Regular)   Pulse 60   Wt  71.6 kg (157 lb 14.4 oz)   SpO2 98%   BMI 26.28 kg/m    In general, the patient is a pleasant female in no apparent distress.    HEENT: NC/AT.  PERRLA.  EOMI.  Sclerae white, not injected.  Nares clear.  Pharynx without erythema or exudate.  Dentition intact.    Neck: No adenopathy.  No thyromegaly. Carotids +4/4 bilaterally without bruits.  No jugular venous distension.   Heart: RRR. Normal S1, S2 splits physiologically. No murmur, rub, click, or gallop. The PMI is in the 5th ICS in the midclavicular line. There is no heave.    Lungs: CTA.  No ronchi, wheezes, rales.  No dullness to percussion.   Abdomen: Soft, nontender, nondistended. No organomegaly.  No bruits.   Extremities: No clubbing, cyanosis, or edema.  The pulses are +4/4 at the radial, brachial, femoral, popliteal, DP, and PT sites bilaterally.  No bruits are noted.  Neurologic: Alert and oriented to person/place/time, normal speech, gait and affect  Skin: No petechiae, purpura or rash.    BP at the end of the visit: 144/82    Labs:  LIPID RESULTS:  Lab Results   Component Value Date    CHOL 186 08/15/2024    HDL 55 08/15/2024     (H) 08/15/2024    TRIG 151 (H) 08/15/2024    NHDL 131 (H) 08/15/2024       LIVER ENZYME RESULTS:  Lab Results   Component Value Date    AST 19 08/15/2024    ALT 12 08/15/2024             BMP RESULTS:  Lab Results   Component Value Date     08/15/2024    POTASSIUM 4.4 08/15/2024    CHLORIDE 99 08/15/2024    CO2 27 08/15/2024    ANIONGAP 9 08/15/2024    GLC 85 08/15/2024    BUN 16.0 08/15/2024    CR 0.84 08/15/2024    GFRESTIMATED 70 08/15/2024    JAZMÍN 9.3 08/15/2024            Procedures:      Assessment and Plan:    I discussed the results with patient.  I discussed the importance of a heart healthy diet and lifestyle.    I discussed the following items:    Medication Changes:   -Continue spironolactone.  -If your top BP number is over 150, take 2.5 mg amlodipine daily.        Follow Up:   -I sent a message to our  device team to assist with transferring your device care here.  -Follow up with Dr. Warren in 1 year with fasting labs and echocardiogram prior to visit.    Cy Warren MD, PhD  Professor of Medicine  Division of Cardiology       CC  Patient Care Team:  Geetha Koch NP as PCP - General  Geetha Koch NP as Referring Physician  Cy Warren MD as MD (Cardiovascular Disease)  Donna Barrett MD as MD (Rheumatology)  Cy Warren MD as Assigned Heart and Vascular Provider  Erika Moser PA-C as Hospitalist (Cardiovascular Disease)  Erika Moser PA-C as Hospitalist (Cardiovascular Disease)  Lenore Villa, RN as Specialty Care Coordinator (Cardiology)  Mary Robert MD as MD (OB/Gyn)  Mary Robert MD as MD (OB/Gyn)  GEETHA KOCH

## 2024-08-15 NOTE — PATIENT INSTRUCTIONS
August 15, 2024    Cardiology Provider You Saw During Your Visit: Dr. Warren      Medication Changes:   -Continue spironolactone.  -If your top BP number is over 150, take 2.5 mg amlodipine daily.      Follow Up:   -I sent a message to our device team to assist with transferring your device care here.  -Follow up with Dr. Warren in 1 year with fasting labs and echocardiogram prior to visit.        Follow the American Heart Association Diet and Lifestyle Recommendations:  -Limit saturated fat, trans fat, sodium, red meat, sweets and sugar-sweetened beverages. If you choose to eat red meat, compare labels and select the leanest cuts available.  -Aim for at least 150 minutes of moderate physical activity or 75 minutes of vigorous physical activity - or an equal combination of both - each week.      To Reach Us:  -During business hours: [phone], press option # 1 to schedule an appointment or to leave a message for your care team.     -After hours, weekends or holidays: [phone], press option #4 and ask to speak to the on-call cardiologist. Inform them you are a patient at the [place].        **If you have a cardiac device, please make sure you schedule an in-person device check just prior to your cardiology provider appointments**        Lenore Villa RN  Cardiology Care Coordinator - General Cardiology  [facility]

## 2024-08-15 NOTE — LETTER
8/15/2024      RE: Bernadette Coffey  [address]  [facility] [address]       Dear Colleague,    Thank you for the opportunity to participate in the care of your patient, Bernadette Coffey, at the [facility] HE[facility]. Please see a copy of my visit note below.    HPI:     I had the privilege to evaluate Mrs Bernadette Coffey, who is a 79 yr female with a past medical history of PMR (polymyalgia rheumatica) (HCC), Interstitial lung disease (HCC), Severe obesity (BMI 35.0-39.9) with comorbidity (HCC), and hypertension. She also has history of hypothyroidism, but this is managed by endocrinology.  She has history of vitamin B12 deficiancy and receives injections for this.      Patient had a PM implantation in [place].    Her major problem is that her blood pressure has progressively increased.    She denies chest pain, shortness of breath, intermittent claudication.    PAST MEDICAL HISTORY:  Past Medical History:   Diagnosis Date     History of anterior colporrhaphy 06/2023       CURRENT MEDICATIONS:  Current Outpatient Medications   Medication Sig Dispense Refill     albuterol (PROAIR HFA/PROVENTIL HFA/VENTOLIN HFA) 108 (90 Base) MCG/ACT inhaler Inhale 1-2 puffs into the lungs       estradiol (ESTRACE) 0.1 MG/GM vaginal cream Place 1 g vaginally three times a week Ok to use your fingers or the applicator 42.5 g 3     fluticasone (FLOVENT HFA) 110 MCG/ACT inhaler Inhale 1 puff into the lungs       folic acid (FOLVITE) 1 MG tablet Take 1 mg by mouth       ketoconazole (NIZORAL) 2 % external cream        levothyroxine (SYNTHROID/LEVOTHROID) 150 MCG tablet Take 1 tablet (150 mcg) by mouth daily       methotrexate 2.5 MG tablet Take 20 mg by mouth       predniSONE (DELTASONE) 1 MG tablet Take 3 mg by mouth       spironolactone (ALDACTONE) 25 MG tablet Take 0.5 tablets (12.5 mg) by mouth daily 45 tablet 2     alendronate (FOSAMAX) 70 MG tablet Take  70 mg by mouth (Patient not taking: Reported on 8/9/2024)       amLODIPine (NORVASC) 5 MG tablet Take 1 tablet (5 mg) by mouth at bedtime (Patient not taking: Reported on 8/9/2024) 90 tablet 2     cholecalciferol 25 MCG (1000 UT) TABS Take 1,000 Units by mouth (Patient not taking: Reported on 9/19/2022)       famotidine (PEPCID) 40 MG tablet Take 40 mg by mouth (Patient not taking: Reported on 8/9/2024)       fluticasone (FLONASE) 50 MCG/ACT nasal spray Spray 2 sprays in nostril (Patient not taking: Reported on 8/9/2024)       latanoprost (XALATAN) 0.005 % ophthalmic solution Apply 1 drop to eye (Patient not taking: Reported on 8/9/2024)       metoprolol succinate ER (TOPROL XL) 25 MG 24 hr tablet Take 1 tablet (25 mg) by mouth daily (Patient not taking: Reported on 8/9/2024) 90 tablet 1     omeprazole (PRILOSEC OTC) 20 MG EC tablet Take 40 mg by mouth (Patient not taking: Reported on 8/9/2024)         PAST SURGICAL HISTORY:      ALLERGIES     Allergies   Allergen Reactions     Shellfish-Derived Products        FAMILY HISTORY:  No family history on file.    SOCIAL HISTORY:  Social History     Socioeconomic History     Marital status:      Spouse name: None     Number of children: None     Years of education: None     Highest education level: None   Tobacco Use     Smoking status: Never     Smokeless tobacco: Never     Social Determinants of Health     Financial Resource Strain: Low Risk  (12/6/2023)    [email or web address]    Overall Financial Resource Strain (CARDIA)      Difficulty of Paying Living Expenses: Not hard at all   Food Insecurity: No Food Insecurity (6/12/2024)    [email or web address]    Hunger Vital Sign      Worried About Running Out of Food in the Last Year: Never true      Ran Out of Food in the Last Year: Never true   Transportation Needs: No Transportation Needs (6/12/2024)    [facility]    PRAPARE - Transportation      Lack of Transportation (Medical): No      Lack of Transportation (Non-Medical): No   Physical Activity: Inactive (9/6/2023)    [facility]    Exercise Vital Sign      Days of Exercise per Week: 0 days      Minutes of Exercise per Session: 0 min   Stress: Stress Concern Present (9/6/2023)    [facility]    [employer] - Occupational Stress Questionnaire      Feeling of Stress : To some extent   Social Connections: Moderately Isolated (9/6/2023)    [facility]    Social Connection and Isolation Panel [NHANES]      Frequency of Communication with Friends and Family: More than three times a week      Frequency of Social Gatherings with Friends and Family: Never      Attends Orthodox Services: Never      Active Member of Clubs or Organizations: No      Attends Club or Organization Meetings: Never      Marital Status:    Interpersonal Safety: Patient Declined (6/14/2024)    Received from [facility]      Do you feel UNSAFE in any of your personal relationships with your family members or any other acquaintances?: Deferred   Housing Stability: Low Risk  (6/12/2024)    Received from [facility]    Housing Stability Vital Sign      Unable to Pay for Housing in the Last Year: No      Number of Times Moved in the Last Year: 0      Homeless in the Last Year: No       ROS:   Constitutional: No fever, chills, or sweats. No weight gain/loss   ENT: No visual disturbance, ear ache, epistaxis, sore throat  Allergies/Immunologic: Negative.   Respiratory: No cough,  hemoptysia  Cardiovascular: As per HPI  GI: No nausea, vomiting, hematemesis, melena, or hematochezia  : No urinary frequency, dysuria, or hematuria  Integument: Negative  Psychiatric: Negative  Neuro: Negative  Endocrinology: Negative   Musculoskeletal: Negative    EXAM:  BP (!) 160/99 (BP Location: Left arm, Patient Position: Chair, Cuff Size: Adult Regular)   Pulse 60   Wt 71.6 kg (157 lb 14.4 oz)   SpO2 98%   BMI 26.28 kg/m    In general, the patient is a pleasant female in no apparent distress.    HEENT: NC/AT.  PERRLA.  EOMI.  Sclerae white, not injected.  Nares clear.  Pharynx without erythema or exudate.  Dentition intact.    Neck: No adenopathy.  No thyromegaly. Carotids +4/4 bilaterally without bruits.  No jugular venous distension.   Heart: RRR. Normal S1, S2 splits physiologically. No murmur, rub, click, or gallop. The PMI is in the 5th ICS in the midclavicular line. There is no heave.    Lungs: CTA.  No ronchi, wheezes, rales.  No dullness to percussion.   Abdomen: Soft, nontender, nondistended. No organomegaly.  No bruits.   Extremities: No clubbing, cyanosis, or edema.  The pulses are +4/4 at the radial, brachial, femoral, popliteal, DP, and PT sites bilaterally.  No bruits are noted.  Neurologic: Alert and oriented to person/place/time, normal speech, gait and affect  Skin: No petechiae, purpura or rash.    BP at the end of the visit: 144/82    Labs:  LIPID RESULTS:  Lab Results   Component Value Date    CHOL 186 08/15/2024    HDL 55 08/15/2024     (H) 08/15/2024    TRIG 151 (H) 08/15/2024    NHDL 131 (H) 08/15/2024       LIVER ENZYME RESULTS:  Lab Results   Component Value Date    AST 19 08/15/2024    ALT 12 08/15/2024             BMP RESULTS:  Lab Results   Component Value Date     08/15/2024    POTASSIUM 4.4 08/15/2024    CHLORIDE 99 08/15/2024    CO2 27 08/15/2024    ANIONGAP 9 08/15/2024    GLC 85 08/15/2024    BUN 16.0 08/15/2024    CR 0.84 08/15/2024    GFRESTIMATED 70  08/15/2024    JAZMÍN 9.3 08/15/2024            Procedures:      Assessment and Plan:    I discussed the results with patient.  I discussed the importance of a heart healthy diet and lifestyle.    I discussed the following items:    Medication Changes:   -Continue spironolactone.  -If your top BP number is over 150, take 2.5 mg amlodipine daily.        Follow Up:   -I sent a message to our device team to assist with transferring your device care here.  -Follow up with Dr. Warren in 1 year with fasting labs and echocardiogram prior to visit.    Cy Warren MD, PhD  Professor of Medicine  Division of Cardiology       CC  Patient Care Team:  Geetha Koch NP as PCP - General  Geetha Koch NP as Referring Physician  Cy Warren MD as MD (Cardiovascular Disease)  Donna Barrett MD as MD (Rheumatology)  Cy Warren MD as Assigned Heart and Vascular Provider  Erika Moser PA-C as Hospitalist (Cardiovascular Disease)  Erika Moser PA-C as Hospitalist (Cardiovascular Disease)  Lenore Villa RN as Specialty Care Coordinator (Cardiology)  Mary Robert MD as MD (OB/Gyn)  Mary Robert MD as MD (OB/Gyn)  GEETHA KOCH      Please do not hesitate to contact me if you have any questions/concerns.     Sincerely,     Cy Warren MD

## 2024-08-15 NOTE — NURSING NOTE
Chief Complaint   Patient presents with    Follow Up     Return Cardiology- Briana F/U     Vitals were taken and medications reconciled.    Tian Zeng, JB  10:28 AM

## 2024-08-19 ENCOUNTER — TELEPHONE (OUTPATIENT)
Dept: CARDIOLOGY | Facility: CLINIC | Age: 79
End: 2024-08-19
Payer: COMMERCIAL

## 2024-08-19 NOTE — TELEPHONE ENCOUNTER
[facility] Call Center    Phone Message    May a detailed message be left on voicemail: yes     Reason for Call: Other: Pt would like a call back asap as she stated her meds were all changed at her last appointment and she needs to discuss this so she can get the right medication refilled      Action Taken: Other: Cardio    Travel Screening: Not Applicable     Date of Service:

## 2024-08-19 NOTE — TELEPHONE ENCOUNTER
Writer called pt. Reiterated AVS instructions from 8/15 [facility] visit. Pt verbalized understanding.

## 2024-09-10 ENCOUNTER — APPOINTMENT (OUTPATIENT)
Dept: OBGYN | Facility: CLINIC | Age: 79
End: 2024-09-10
Attending: OBSTETRICS & GYNECOLOGY
Payer: COMMERCIAL

## 2024-09-10 ENCOUNTER — OFFICE VISIT (OUTPATIENT)
Dept: UROLOGY | Facility: CLINIC | Age: 79
End: 2024-09-10
Attending: OBSTETRICS & GYNECOLOGY
Payer: COMMERCIAL

## 2024-09-10 VITALS
BODY MASS INDEX: 26.16 KG/M2 | HEART RATE: 48 BPM | HEIGHT: 65 IN | WEIGHT: 157 LBS | SYSTOLIC BLOOD PRESSURE: 130 MMHG | DIASTOLIC BLOOD PRESSURE: 80 MMHG

## 2024-09-10 DIAGNOSIS — N81.11 CYSTOCELE, MIDLINE: Primary | ICD-10-CM

## 2024-09-10 DIAGNOSIS — N81.6 RECTOCELE: ICD-10-CM

## 2024-09-10 PROCEDURE — 57160 INSERT PESSARY/OTHER DEVICE: CPT | Performed by: OBSTETRICS & GYNECOLOGY

## 2024-09-10 PROCEDURE — 99213 OFFICE O/P EST LOW 20 MIN: CPT | Performed by: OBSTETRICS & GYNECOLOGY

## 2024-09-10 PROCEDURE — 99214 OFFICE O/P EST MOD 30 MIN: CPT | Mod: 25 | Performed by: OBSTETRICS & GYNECOLOGY

## 2024-09-10 NOTE — PROGRESS NOTES
Ms Coffey is here today to discuss her urinary retention and management of her prolapse which is likely contributing. I last saw her as a new patient on 24 for recurrent UTI and on exam, she was noted to have a stage 2 cystocele/rectocele and urinary retention with a post void residual of 330ml. She went home with a nath catheter ( did not want to do self cath). The plan was to treat her atrophy first and then bring her in for pessary fitting vs discussion of surgery.  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 She had anterior colporrhaphy for cystocele a year ago and was also started on daily macrobid UTI prophylaxis after which time she has not had any UTI symptoms.   No recent urine study or urologic imaging in the last year in our records including care everywhere. .      Urinary Symptoms/Voiding function  Rare stress leaks with chronic coughing. Has urgency leaks mostly at night ( wears apad), occasionally urge leaks during the day if she is holding her bladder longer. Voids every 2-3 hours during the day and 1-2 times at night. No dysuria/gross hematuria    Pelvic Organ Prolapse Symptoms  Denies vaginal bulge, pressure sensation or protrusion.      Gastrointestinal Symptoms:  Denies chronic diarrhea, constipation. Denies bothersome fecal or flatal incontinence. Denies defecatory difficulties.     Sexual function/Pelvic floor pain/GYN:   Not sexually active ( partner reasons).       Relevant Medical History:    Diabetes? no  High Blood pressure? yes     Recurrent UTIs? Yes ( resolved it seems)  Sleep Apnea?  "no  Obesity? Body mass index is 28.96 kg/m .  History of Blood clots? no  Other medical problems: none        Physical Exam: BP (!) 142/81 (BP Location: Left arm, Patient Position: Sitting)   Pulse 73   Ht 1.651 m (5' 5\")   Wt 78.9 kg (174 lb)   SpO2 97%   BMI 28.96 kg/m      There were no vitals taken for this visit. No LMP recorded. There is no height or weight on file to calculate BMI.    Gen:  is alert, comfortable in no acute distress,   Abdomen: Abdomen is soft, non-tender, non-distended,   Lungs: non-labored breathing.     Pelvic Exam:   Normal external female genitalia. The urethra was Normal.    Vagina: stage 2 ant prolapse with some apical and post component. Mod to severe atrophy  Uterus: Normal size, non tender   Ovaries: No palpable mass   Vulva: No lesions, no pain   Rectal: Deferred     Pelvic floor strength: 3/5 kegels.    Pelvic floor muscles: No myalgia    POPQ EXAM FOR PROLAPSE SEVERITY  (Aa):   -1 (Ba):   -1 (C ):    -6   (GH):   3 (PB):  2 (TVL):  8   (Ap):   -1 (Bp):  -1 (D):   -5     ICS Stage (1-4):  2    Voiding trial:     mL by Bladder ultrasound  Confirmed with cath as well  Nath placed.          /80   Pulse (!) 48   Ht 1.651 m (5' 5\")   Wt 71.2 kg (157 lb)   BMI 26.13 kg/m    Pessary fitting    A size 6 ring with support pessary was fitted today. Nath removed. Voiding trial showed PVR of 70 ml.     Assessment and plan   Bernadette was seen today for consult.    Diagnoses and all orders for this visit:    Cystocele, midline  -     Fit/Insert Intravaginal Support Device/Pessary (77525)  -     PESSARY, REUSABLE, NON RUBBER,ANY TYPE    Rectocele  -     Fit/Insert Intravaginal Support Device/Pessary (69343)  -     PESSARY, REUSABLE, NON RUBBER,ANY TYPE      Pessary fitted today. Voiding function normalized so nath removed  We discussed surgical options primarily vaginal approach native tissue surgeries given her age. For now, we will do clinic managed pessary. She will " return once every 3 months for pessary cleaning. Ok to do this locally if she has access to local OBGYNs given the distance.         I spent 30 minutes face-to-face with Bernadette Coffey on the date of the encounter in chart review, patient visit, review of tests, documentation and/or discussion with other providers about the issues documented above.

## 2024-09-10 NOTE — PATIENT INSTRUCTIONS
Thank you for trusting us with your care!   Please be aware, if you are on Mychart, you may see your results prior to your providers review. If labs are abnormal, we will call or message you on [employer] with a follow up plan.    If you need to contact us for questions about:  Symptoms, Scheduling & Medical Questions; Non-urgent (2-3 day response) [employer] message, Urgent (needing response today) [phone] (if after 3:30pm next day response)   Prescriptions: Please call your Pharmacy   Billing: Abigail [phone] or [facility] [employer]:[phone]

## 2024-09-10 NOTE — LETTER
9/10/2024       RE: Bernadette Coffey  [address]  [facility] [address]     Dear Colleague,    Thank you for referring your patient, Bernadette Coffey, to the [facility] [place] at [facility]. Please see a copy of my visit note below.    Ms Coffey is here today to discuss her urinary retention and management of her prolapse which is likely contributing. I last saw her as a new patient on 24 for recurrent UTI and on exam, she was noted to have a stage 2 cystocele/rectocele and urinary retention with a post void residual of 330ml. She went home with a nath catheter ( did not want to do self cath). The plan was to treat her atrophy first and then bring her in for pessary fitting vs discussion of surgery.  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 She had anterior colporrhaphy for cystocele a year ago and was also started on daily macrobid UTI prophylaxis after which time she has not had any UTI symptoms.   No recent urine study or urologic imaging in the last year in our records including care everywhere. .      Urinary Symptoms/Voiding function  Rare stress leaks with chronic coughing. Has urgency leaks mostly at night ( wears apad), occasionally urge leaks during the day if she is holding her bladder longer. Voids every 2-3 hours during the day and 1-2 times at night. No dysuria/gross hematuria    Pelvic Organ Prolapse Symptoms  Denies vaginal bulge, pressure sensation or protrusion.      Gastrointestinal Symptoms:  Denies chronic diarrhea,  "constipation. Denies bothersome fecal or flatal incontinence. Denies defecatory difficulties.     Sexual function/Pelvic floor pain/GYN:   Not sexually active ( partner reasons).       Relevant Medical History:    Diabetes? no  High Blood pressure? yes     Recurrent UTIs? Yes ( resolved it seems)  Sleep Apnea? no  Obesity? Body mass index is 28.96 kg/m .  History of Blood clots? no  Other medical problems: none        Physical Exam: BP (!) 142/81 (BP Location: Left arm, Patient Position: Sitting)   Pulse 73   Ht 1.651 m (5' 5\")   Wt 78.9 kg (174 lb)   SpO2 97%   BMI 28.96 kg/m      There were no vitals taken for this visit. No LMP recorded. There is no height or weight on file to calculate BMI.    Gen:  is alert, comfortable in no acute distress,   Abdomen: Abdomen is soft, non-tender, non-distended,   Lungs: non-labored breathing.     Pelvic Exam:   Normal external female genitalia. The urethra was Normal.    Vagina: stage 2 ant prolapse with some apical and post component. Mod to severe atrophy  Uterus: Normal size, non tender   Ovaries: No palpable mass   Vulva: No lesions, no pain   Rectal: Deferred     Pelvic floor strength: 3/5 kegels.    Pelvic floor muscles: No myalgia    POPQ EXAM FOR PROLAPSE SEVERITY  (Aa):   -1 (Ba):   -1 (C ):    -6   (GH):   3 (PB):  2 (TVL):  8   (Ap):   -1 (Bp):  -1 (D):   -5     ICS Stage (1-4):  2    Voiding trial:     mL by Bladder ultrasound  Confirmed with cath as well  Mathias placed.          /80   Pulse (!) 48   Ht 1.651 m (5' 5\")   Wt 71.2 kg (157 lb)   BMI 26.13 kg/m    Pessary fitting    A size 6 ring with support pessary was fitted today. Mathias removed. Voiding trial showed PVR of 70 ml.     Assessment and plan   Bernadette was seen today for consult.    Diagnoses and all orders for this visit:    Cystocele, midline  -     Fit/Insert Intravaginal Support Device/Pessary (28301)  -     PESSARY, REUSABLE, NON RUBBER,ANY TYPE    Rectocele  -     Fit/Insert " Intravaginal Support Device/Pessary ([id number])  -     PESSARY, REUSABLE, NON RUBBER,ANY TYPE      Pessary fitted today. Voiding function normalized so nath removed  We discussed surgical options primarily vaginal approach native tissue surgeries given her age. For now, we will do clinic managed pessary. She will return once every 3 months for pessary cleaning. Ok to do this locally if she has access to local OBGYNs given the distance.         I spent 30 minutes face-to-face with Bernadette Coffey on the date of the encounter in chart review, patient visit, review of tests, documentation and/or discussion with other providers about the issues documented above.       Again, thank you for allowing me to participate in the care of your patient.      Sincerely,    Mary Robert MD

## 2024-09-13 PROBLEM — N81.6 RECTOCELE: Status: ACTIVE | Noted: 2024-09-13

## 2024-09-13 PROBLEM — N81.11 CYSTOCELE, MIDLINE: Status: ACTIVE | Noted: 2024-09-13

## 2024-09-17 ENCOUNTER — TELEPHONE (OUTPATIENT)
Dept: CARDIOLOGY | Facility: CLINIC | Age: 79
End: 2024-09-17
Payer: COMMERCIAL

## 2024-09-17 NOTE — TELEPHONE ENCOUNTER
"Writer called to check in on pt since BP was quite high last time she was in clinic. Pt didn't have specific readings to give to writer. However, she states she has been checking her BP, and it has been \"good.\" She notes the top BP number has been around 120.     Writer advised pt to continue monitoring BP and to notify clinic if the top BP number is consistently at or above 140. Pt verbalized understanding.  "

## 2024-09-18 NOTE — TELEPHONE ENCOUNTER
[facility] Call Center    Phone Message    May a detailed message be left on voicemail: yes     Reason for Call: Other: Pt is calling to report her BP today.  10 am 140/90 and 4:45 pm 157/83     Action Taken: Other: cardio    Travel Screening: Not Applicable    Thank you!  [facility]       Date of Service:

## 2024-09-19 NOTE — TELEPHONE ENCOUNTER
Writer returned call to pt. Pt now stating her BP has actually been running high, but sounds like she just started recording her BPs yesterday. Writer advised that pt monitor and record readings for 1 week and then call clinic with her readings.     Pt verbalized understanding.

## 2024-10-07 NOTE — TELEPHONE ENCOUNTER
Writer called pt to review recent BP readings.    152/78  150/66  134/66  150/66  133/80  131/75  117/32  140/70  127/87    Pt reports that she took 2.5 mg amlodipine when SBP at or above 150. Pt did not, however, recheck BP after taking the 2.5 mg amlodipine.    Writer will forward readings to Dr. Warren to review.

## 2024-10-08 NOTE — TELEPHONE ENCOUNTER
"Writer called pt to relay Dr. Warren' recommendation:    \"I called patient and recommended first to buy an [employer] with 3 consecutive measurements    And  ask her to send her the 2nd and 3rd BP in each BP measurement\"    Pt verbalized understanding and states she is on the lookout for an [employer] BP monitor. Writer will touch base with pt again in a few weeks.    "

## 2024-12-17 ENCOUNTER — OFFICE VISIT (OUTPATIENT)
Dept: UROLOGY | Facility: CLINIC | Age: 79
End: 2024-12-17
Attending: OBSTETRICS & GYNECOLOGY
Payer: COMMERCIAL

## 2024-12-17 VITALS
HEART RATE: 76 BPM | HEIGHT: 65 IN | WEIGHT: 160.6 LBS | BODY MASS INDEX: 26.76 KG/M2 | DIASTOLIC BLOOD PRESSURE: 85 MMHG | SYSTOLIC BLOOD PRESSURE: 171 MMHG

## 2024-12-17 DIAGNOSIS — Z46.89 PESSARY MAINTENANCE: Primary | ICD-10-CM

## 2024-12-17 DIAGNOSIS — R30.0 DYSURIA: ICD-10-CM

## 2024-12-17 LAB
ALBUMIN UR-MCNC: 100 MG/DL
APPEARANCE UR: ABNORMAL
BILIRUB UR QL STRIP: NEGATIVE
COLOR UR AUTO: YELLOW
GLUCOSE UR STRIP-MCNC: NEGATIVE MG/DL
HGB UR QL STRIP: ABNORMAL
KETONES UR STRIP-MCNC: NEGATIVE MG/DL
LEUKOCYTE ESTERASE UR QL STRIP: ABNORMAL
NITRATE UR QL: POSITIVE
PH UR STRIP: 8.5 [PH] (ref 5–8)
SP GR UR STRIP: 1.02 (ref 1–1.03)
UROBILINOGEN UR STRIP-ACNC: 0.2 E.U./DL

## 2024-12-17 PROCEDURE — 87186 SC STD MICRODIL/AGAR DIL: CPT | Performed by: OBSTETRICS & GYNECOLOGY

## 2024-12-17 PROCEDURE — 99213 OFFICE O/P EST LOW 20 MIN: CPT | Performed by: OBSTETRICS & GYNECOLOGY

## 2024-12-17 PROCEDURE — 81003 URINALYSIS AUTO W/O SCOPE: CPT | Performed by: OBSTETRICS & GYNECOLOGY

## 2024-12-17 PROCEDURE — 87088 URINE BACTERIA CULTURE: CPT | Performed by: OBSTETRICS & GYNECOLOGY

## 2024-12-17 RX ORDER — ESTRADIOL 0.1 MG/G
1 CREAM VAGINAL
Qty: 42.5 G | Refills: 11 | Status: SHIPPED | OUTPATIENT
Start: 2024-12-18
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
## 2024-12-17 NOTE — PATIENT INSTRUCTIONS
Azo for urinary pain/ spasms      Vaginal estrogen pea sized amount on finger nightly for 2 weeks. Then 3x/week at bedtime.     Metamucil and benefiber for fiber intake with miralax as needed. Take with at least 8oz of water.          Thank you for trusting us with your care!   Please be aware, if you are on Mychart, you may see your results prior to your providers review. If labs are abnormal, we will call or message you on [facility] with a follow up plan.    If you need to contact us for questions about:  Symptoms, Scheduling & Medical Questions; Non-urgent (2-3 day response) [facility] message, Urgent (needing response today) [phone] (if after 3:30pm next day response)   Prescriptions: Please call your Pharmacy   Billing: Abigail [phone] or [facility]:[phone]

## 2024-12-17 NOTE — NURSING NOTE
Chief Complaint   Patient presents with    Pessary Check/Fit/Insert     3 months pessary cleaning ( size 6 ring with support)

## 2024-12-18 LAB — BACTERIA UR CULT: ABNORMAL

## 2024-12-19 ENCOUNTER — MYC MEDICAL ADVICE (OUTPATIENT)
Dept: UROLOGY | Facility: CLINIC | Age: 79
End: 2024-12-19
Payer: COMMERCIAL

## 2024-12-19 DIAGNOSIS — N39.0 RECURRENT UTI: Primary | ICD-10-CM

## 2024-12-19 RX ORDER — SULFAMETHOXAZOLE AND TRIMETHOPRIM 800; 160 MG/1; MG/1
1 TABLET ORAL 2 TIMES DAILY
Qty: 6 TABLET | Refills: 0 | Status: SHIPPED | OUTPATIENT
Start: 2024-12-19

## 2024-12-19 NOTE — TELEPHONE ENCOUNTER
Patient informed of UTI and treatment that was sent into pharmacy. Patient in agreement with plan of care.

## 2025-03-18 ENCOUNTER — OFFICE VISIT (OUTPATIENT)
Dept: UROLOGY | Facility: CLINIC | Age: 80
End: 2025-03-18
Attending: OBSTETRICS & GYNECOLOGY
Payer: COMMERCIAL

## 2025-03-18 VITALS
SYSTOLIC BLOOD PRESSURE: 139 MMHG | HEART RATE: 66 BPM | DIASTOLIC BLOOD PRESSURE: 79 MMHG | BODY MASS INDEX: 26.73 KG/M2 | HEIGHT: 65 IN

## 2025-03-18 DIAGNOSIS — N81.6 RECTOCELE: ICD-10-CM

## 2025-03-18 DIAGNOSIS — Z96.0 PRESENCE OF PESSARY: ICD-10-CM

## 2025-03-18 DIAGNOSIS — N39.0 RECURRENT UTI: ICD-10-CM

## 2025-03-18 DIAGNOSIS — Z46.89 PESSARY MAINTENANCE: Primary | ICD-10-CM

## 2025-03-18 DIAGNOSIS — N81.11 CYSTOCELE, MIDLINE: ICD-10-CM

## 2025-03-18 PROCEDURE — 3078F DIAST BP <80 MM HG: CPT | Performed by: OBSTETRICS & GYNECOLOGY

## 2025-03-18 PROCEDURE — 3075F SYST BP GE 130 - 139MM HG: CPT | Performed by: OBSTETRICS & GYNECOLOGY

## 2025-03-18 PROCEDURE — 99213 OFFICE O/P EST LOW 20 MIN: CPT | Performed by: OBSTETRICS & GYNECOLOGY

## 2025-03-18 NOTE — PATIENT INSTRUCTIONS
Thank you for trusting us with your care!   Please be aware, if you are on Mychart, you may see your results prior to your providers review. If labs are abnormal, we will call or message you on [facility] with a follow up plan.    If you need to contact us for questions about:  Symptoms, Scheduling & Medical Questions; Non-urgent (2-3 day response) [facility] message, Urgent (needing response today) [phone] (if after 3:30pm next day response)   Prescriptions: Please call your Pharmacy   Billing: Abigail [phone] or [facility]:[phone]

## 2025-03-18 NOTE — PROGRESS NOTES
Ms Coffey is here for pessary cleaning. She is using a #6 ring with support pessary for stage 2 anterior/posterior prolapse which has helped resolve her urinary retention issues. She is using estrace cream but very sporadically once every 2 months or so. No vaginal bleeding or abnormal discharge. At her last pessary maintenance visit on 12.17.24 , she had symptomatic urinary tract infection with urine culture showing >100k cfu/ml of Proteus mirabilis ( resistant to nitrofurantoin) which was treated with Bactrim. She has had 2 more culture proven UTIs since then, one with >100,000 cfu/mL Klebsiella (Enterobacter) aerogenes ( 2.3.24  sensitive to bactrim and intermediate to nitrofurantoin)and one with >100,000 cfu/mL Enterococcus faecalis  (3.11.25, sensitive to nitrofurantoin). She has a prescription for nitrofurantoin for 7 days from her PMD but hasn't started taking it. She has urgency, frequency and dysuria.     Of note, she had a fall around [facility] where she apparently broke her arm and nose. She is now describing some abdominal area pain that wraps around her low back especially with movement.     Pessary cleaning    Pessary removed. No discharge or bleeding in the vagina. No abrasion. Pessary cleaned and placed back. Small tear at introitus with pessary removal.     Assessment and plan  Encounter Diagnoses   Name Primary?    Cystocele, midline     Rectocele     Presence of pessary     Pessary maintenance Yes    Recurrent UTI          Pessary cleaned today  Encouraged her to use her estrace cream three times a week ( 1gram) or daily (1/2 gram) to reduce risk of UTIs and to treat her atrophy  Hydration   Return for pessary cleaning with Elham Powers in 3 months      I spent 20 minutes face-to-face with Bernadette Coffey on the date of the encounter in chart review, patient visit, review of tests, documentation and/or discussion with other providers about the issues documented above.

## 2025-03-18 NOTE — LETTER
3/18/2025       RE: Bernadette Coffey  [address]  [facility] [address]     Dear Colleague,    Thank you for referring your patient, Bernadette Coffey, to the [facility] [place] at [facility]. Please see a copy of my visit note below.    Ms Coffey is here for pessary cleaning. She is using a #6 ring with support pessary for stage 2 anterior/posterior prolapse which has helped resolve her urinary retention issues. She is using estrace cream but very sporadically once every 2 months or so. No vaginal bleeding or abnormal discharge. At her last pessary maintenance visit on 12.17.24 , she had symptomatic urinary tract infection with urine culture showing >100k cfu/ml of Proteus mirabilis ( resistant to nitrofurantoin) which was treated with Bactrim. She has had 2 more culture proven UTIs since then, one with >100,000 cfu/mL Klebsiella (Enterobacter) aerogenes ( 2.3.24  sensitive to bactrim and intermediate to nitrofurantoin)and one with >100,000 cfu/mL Enterococcus faecalis  (3.11.25, sensitive to nitrofurantoin). She has a prescription for nitrofurantoin for 7 days from her PMD but hasn't started taking it. She has urgency, frequency and dysuria.     Of note, she had a fall around [facility] where she apparently broke her arm and nose. She is now describing some abdominal area pain that wraps around her low back especially with movement.     Pessary cleaning    Pessary removed. No discharge or bleeding in the vagina. No abrasion. Pessary cleaned and placed back. Small tear at introitus with pessary removal.     Assessment and plan  Encounter Diagnoses   Name Primary?     Cystocele, midline      Rectocele      Presence of pessary      Pessary maintenance Yes     Recurrent UTI          Pessary cleaned today  Encouraged her to use her estrace cream three times a week ( 1gram) or daily (1/2 gram) to reduce risk of UTIs and to treat her  atrophy  Hydration   Return for pessary cleaning with Elham Powers in 3 months      I spent 20 minutes face-to-face with Bernadette Coffey on the date of the encounter in chart review, patient visit, review of tests, documentation and/or discussion with other providers about the issues documented above.               Again, thank you for allowing me to participate in the care of your patient.      Sincerely,    Mary Robert MD

## 2025-03-18 NOTE — NURSING NOTE
Chief Complaint   Patient presents with    Pessary Check/Fit/Insert     3 months pessary cleaning, size 6 ring with support

## 2025-05-01 ENCOUNTER — TELEPHONE (OUTPATIENT)
Dept: CARDIOLOGY | Facility: CLINIC | Age: 80
End: 2025-05-01
Payer: COMMERCIAL

## 2025-05-01 NOTE — TELEPHONE ENCOUNTER
Left Voicemail (1st Attempt) and Sent Mychart (1st Attempt) for the patient to call back and schedule the following:    Appointment type: RTN CARDIO  Provider: BRIANDA  Return date: AUGUST 2025 OR AFTER  Specialty phone number: [phone] OPT 1  Additional appointment(s) needed: ECHO AND FASTING LABS PRIOR  Additonal Notes: ANNUAL FOLLOW-UP WITH ECHO AND FASTING LABS

## 2025-06-05 ENCOUNTER — TELEPHONE (OUTPATIENT)
Dept: OBGYN | Facility: CLINIC | Age: 80
End: 2025-06-05
Payer: COMMERCIAL

## 2025-08-27 ENCOUNTER — OFFICE VISIT (OUTPATIENT)
Dept: CARDIOLOGY | Facility: CLINIC | Age: 80
End: 2025-08-27
Attending: STUDENT IN AN ORGANIZED HEALTH CARE EDUCATION/TRAINING PROGRAM
Payer: COMMERCIAL

## 2025-08-27 ENCOUNTER — LAB (OUTPATIENT)
Dept: LAB | Facility: CLINIC | Age: 80
End: 2025-08-27
Attending: STUDENT IN AN ORGANIZED HEALTH CARE EDUCATION/TRAINING PROGRAM
Payer: COMMERCIAL

## 2025-08-27 VITALS
DIASTOLIC BLOOD PRESSURE: 84 MMHG | WEIGHT: 151.7 LBS | HEART RATE: 61 BPM | OXYGEN SATURATION: 99 % | SYSTOLIC BLOOD PRESSURE: 138 MMHG | BODY MASS INDEX: 25.24 KG/M2

## 2025-08-27 DIAGNOSIS — R00.2 PALPITATIONS: ICD-10-CM

## 2025-08-27 DIAGNOSIS — I10 BENIGN ESSENTIAL HYPERTENSION: ICD-10-CM

## 2025-08-27 DIAGNOSIS — R07.89 CHEST PRESSURE: Primary | ICD-10-CM

## 2025-08-27 DIAGNOSIS — E78.5 HYPERLIPIDEMIA LDL GOAL <100: ICD-10-CM

## 2025-08-27 LAB
ALBUMIN SERPL BCG-MCNC: 4.1 G/DL (ref 3.5–5.2)
ALP SERPL-CCNC: 91 U/L (ref 40–150)
ALT SERPL W P-5'-P-CCNC: 8 U/L (ref 0–50)
ANION GAP SERPL CALCULATED.3IONS-SCNC: 14 MMOL/L (ref 7–15)
AST SERPL W P-5'-P-CCNC: 22 U/L (ref 0–45)
BILIRUB SERPL-MCNC: 0.4 MG/DL
BUN SERPL-MCNC: 20.4 MG/DL (ref 8–23)
CALCIUM SERPL-MCNC: 9.2 MG/DL (ref 8.8–10.4)
CHLORIDE SERPL-SCNC: 98 MMOL/L (ref 98–107)
CHOLEST SERPL-MCNC: 197 MG/DL
CREAT SERPL-MCNC: 0.8 MG/DL (ref 0.51–0.95)
EGFRCR SERPLBLD CKD-EPI 2021: 74 ML/MIN/1.73M2
ERYTHROCYTE [DISTWIDTH] IN BLOOD BY AUTOMATED COUNT: 13.1 % (ref 10–15)
FASTING STATUS PATIENT QL REPORTED: ABNORMAL
FASTING STATUS PATIENT QL REPORTED: NO
GLUCOSE SERPL-MCNC: 118 MG/DL (ref 70–99)
HCO3 SERPL-SCNC: 22 MMOL/L (ref 22–29)
HCT VFR BLD AUTO: 37.6 % (ref 35–47)
HDLC SERPL-MCNC: 51 MG/DL
HGB BLD-MCNC: 12.5 G/DL (ref 11.7–15.7)
LDLC SERPL CALC-MCNC: 127 MG/DL
LDLC SERPL DIRECT ASSAY-MCNC: 130 MG/DL
LVEF ECHO: NORMAL
MCH RBC QN AUTO: 30 PG (ref 26.5–33)
MCHC RBC AUTO-ENTMCNC: 33.2 G/DL (ref 31.5–36.5)
MCV RBC AUTO: 90.2 FL (ref 78–100)
NONHDLC SERPL-MCNC: 146 MG/DL
PLATELET # BLD AUTO: 231 10E3/UL (ref 150–450)
POTASSIUM SERPL-SCNC: 4.4 MMOL/L (ref 3.4–5.3)
PROT SERPL-MCNC: 7.2 G/DL (ref 6.4–8.3)
RBC # BLD AUTO: 4.17 10E6/UL (ref 3.8–5.2)
SODIUM SERPL-SCNC: 134 MMOL/L (ref 135–145)
T4 FREE SERPL-MCNC: 1.05 NG/DL (ref 0.9–1.7)
TRIGL SERPL-MCNC: 95 MG/DL
TSH SERPL DL<=0.005 MIU/L-ACNC: 19.81 UIU/ML (ref 0.3–4.2)
WBC # BLD AUTO: 7.61 10E3/UL (ref 4–11)

## 2025-08-27 PROCEDURE — 80053 COMPREHEN METABOLIC PANEL: CPT | Performed by: PATHOLOGY

## 2025-08-27 PROCEDURE — 36415 COLL VENOUS BLD VENIPUNCTURE: CPT | Performed by: PATHOLOGY

## 2025-08-27 PROCEDURE — 99213 OFFICE O/P EST LOW 20 MIN: CPT | Performed by: STUDENT IN AN ORGANIZED HEALTH CARE EDUCATION/TRAINING PROGRAM

## 2025-08-27 PROCEDURE — 85027 COMPLETE CBC AUTOMATED: CPT | Performed by: PATHOLOGY

## 2025-08-27 PROCEDURE — 80061 LIPID PANEL: CPT | Performed by: PATHOLOGY

## 2025-08-27 PROCEDURE — 93306 TTE W/DOPPLER COMPLETE: CPT | Performed by: INTERNAL MEDICINE

## 2025-08-27 PROCEDURE — 84443 ASSAY THYROID STIM HORMONE: CPT | Performed by: PATHOLOGY

## 2025-08-27 PROCEDURE — 84439 ASSAY OF FREE THYROXINE: CPT | Performed by: PATHOLOGY

## 2025-08-27 RX ORDER — PRAVASTATIN SODIUM 20 MG
20 TABLET ORAL DAILY
Qty: 90 TABLET | Refills: 3 | Status: SHIPPED | OUTPATIENT
Start: 2025-08-27

## 2025-08-27 RX ORDER — NITROGLYCERIN 0.4 MG/1
TABLET SUBLINGUAL
Qty: 10 TABLET | Refills: 0 | Status: SHIPPED | OUTPATIENT
Start: 2025-08-27

## 2025-08-27 RX ORDER — ALENDRONATE SODIUM 70 MG/1
70 TABLET ORAL WEEKLY
COMMUNITY
Start: 2025-08-01 | End: 2026-08-01

## 2025-08-27 RX ORDER — CYANOCOBALAMIN 1000 UG/ML
1000 INJECTION, SOLUTION INTRAMUSCULAR; SUBCUTANEOUS
COMMUNITY

## 2025-08-27 ASSESSMENT — PAIN SCALES - GENERAL: PAINLEVEL_OUTOF10: NO PAIN (0)
